# Patient Record
Sex: MALE | Race: BLACK OR AFRICAN AMERICAN | NOT HISPANIC OR LATINO | Employment: UNEMPLOYED | URBAN - METROPOLITAN AREA
[De-identification: names, ages, dates, MRNs, and addresses within clinical notes are randomized per-mention and may not be internally consistent; named-entity substitution may affect disease eponyms.]

---

## 2019-02-06 ENCOUNTER — OFFICE VISIT CONVERTED (OUTPATIENT)
Dept: INTERNAL MEDICINE | Facility: CLINIC | Age: 54
End: 2019-02-06
Attending: NURSE PRACTITIONER

## 2019-02-06 ENCOUNTER — CONVERSION ENCOUNTER (OUTPATIENT)
Dept: INTERNAL MEDICINE | Facility: CLINIC | Age: 54
End: 2019-02-06

## 2019-03-06 ENCOUNTER — OFFICE VISIT CONVERTED (OUTPATIENT)
Dept: INTERNAL MEDICINE | Facility: CLINIC | Age: 54
End: 2019-03-06
Attending: NURSE PRACTITIONER

## 2019-05-02 ENCOUNTER — OFFICE VISIT CONVERTED (OUTPATIENT)
Dept: NEUROLOGY | Facility: CLINIC | Age: 54
End: 2019-05-02
Attending: PSYCHIATRY & NEUROLOGY

## 2019-05-02 ENCOUNTER — CONVERSION ENCOUNTER (OUTPATIENT)
Dept: NEUROLOGY | Facility: CLINIC | Age: 54
End: 2019-05-02

## 2019-05-16 ENCOUNTER — OFFICE VISIT CONVERTED (OUTPATIENT)
Dept: INTERNAL MEDICINE | Facility: CLINIC | Age: 54
End: 2019-05-16
Attending: NURSE PRACTITIONER

## 2019-05-16 ENCOUNTER — HOSPITAL ENCOUNTER (OUTPATIENT)
Dept: OTHER | Facility: HOSPITAL | Age: 54
Discharge: HOME | End: 2019-05-16
Attending: NURSE PRACTITIONER

## 2019-05-16 ENCOUNTER — CONVERSION ENCOUNTER (OUTPATIENT)
Dept: INTERNAL MEDICINE | Facility: CLINIC | Age: 54
End: 2019-05-16

## 2020-10-23 ENCOUNTER — OFFICE VISIT CONVERTED (OUTPATIENT)
Dept: INTERNAL MEDICINE | Facility: CLINIC | Age: 55
End: 2020-10-23
Attending: NURSE PRACTITIONER

## 2020-11-12 ENCOUNTER — OFFICE VISIT CONVERTED (OUTPATIENT)
Dept: ORTHOPEDIC SURGERY | Facility: CLINIC | Age: 55
End: 2020-11-12
Attending: ORTHOPAEDIC SURGERY

## 2020-11-12 ENCOUNTER — CONVERSION ENCOUNTER (OUTPATIENT)
Dept: ORTHOPEDIC SURGERY | Facility: CLINIC | Age: 55
End: 2020-11-12

## 2020-12-09 ENCOUNTER — HOSPITAL ENCOUNTER (OUTPATIENT)
Dept: PREADMISSION TESTING | Facility: HOSPITAL | Age: 55
Discharge: HOME | End: 2020-12-09
Attending: ORTHOPAEDIC SURGERY

## 2020-12-11 LAB — SARS-COV-2 RNA SPEC QL NAA+PROBE: DETECTED

## 2021-01-08 ENCOUNTER — HOSPITAL ENCOUNTER (OUTPATIENT)
Dept: PREADMISSION TESTING | Facility: HOSPITAL | Age: 56
Discharge: HOME | End: 2021-01-08
Attending: ORTHOPAEDIC SURGERY

## 2021-01-11 ENCOUNTER — HOSPITAL ENCOUNTER (OUTPATIENT)
Dept: PERIOP | Facility: HOSPITAL | Age: 56
Setting detail: HOSPITAL OUTPATIENT SURGERY
Discharge: HOME | End: 2021-01-11
Attending: ORTHOPAEDIC SURGERY

## 2021-01-26 ENCOUNTER — OFFICE VISIT CONVERTED (OUTPATIENT)
Dept: ORTHOPEDIC SURGERY | Facility: CLINIC | Age: 56
End: 2021-01-26
Attending: ORTHOPAEDIC SURGERY

## 2021-05-10 NOTE — H&P
History and Physical      Patient Name: Eric Pal   Patient ID: [id number]   Sex: Male   YOB: 1965    Primary Care Provider: Tawanna QUINONES   Referring Provider: Tawanna QUINONES    Visit Date: November 12, 2020    Provider: Wild Henriquez MD   Location: [facility]   Location Address: [address] [place] [address]  [id number]   Location Phone: [phone]          Chief Complaint  · Left hand pain      History Of Present Illness  Eric Pal is a 55 year old /Black male who presents today to [facility].      The patient presents today for evaluation of the left hand nodule. This has been present over the last 2 months. He denies specific injury or trauma to the hand. No other treatments have been tried.          Past Medical History  Hyperlipidemia; Sarcoidosis; Seizures; Shortness of Breath; Stroke         Past Surgical History  I have had no surgeries; Pacemaker.; Sinus Surgery         Medication List  aspirin 81 mg oral tablet,chewable; prednisone 20 mg oral tablet; Symbicort 160-4.5 mcg/actuation inhalation HFA aerosol inhaler         Allergy List  PENICILLINS       Allergies Reconciled  Family Medical History  Diabetes, unspecified type; Diabetes; Family history of Arthritis; Family history of diabetes mellitus         Social History  Alcohol (Current some day); Alcohol Use (Current some day); lives with spouse; ; .; Recreational Drug Use (Never); Retired; Retired.; Tobacco (Never)         Review of Systems  · Constitutional  o Denies  o : fever, chills, weight loss  · Cardiovascular  o Denies  o : chest pain, shortness of breath  · Gastrointestinal  o Denies  o : liver disease, heartburn, nausea, blood in stools  · Genitourinary  o Denies  o : painful urination, blood in urine  · Integument  o Denies  o : rash, itching  · Neurologic  o Denies  o : headache, weakness, loss of  "consciousness  · Musculoskeletal  o Denies  o : painful, swollen joints  · Psychiatric  o Denies  o : drug/alcohol addiction, anxiety, depression      Vitals  Date Time BP Position Site L\R Cuff Size HR RR TEMP (F) WT  HT  BMI kg/m2 BSA m2 O2 Sat FR L/min FiO2 HC       11/12/2020 09:07 AM         245lbs 0oz 6'  2\" 31.46 2.41             Physical Examination  · Constitutional  o Appearance  o : well developed, well-nourished, no obvious deformities present  · Head and Face  o Head  o :   § Inspection  § : normocephalic  o Face  o :   § Inspection  § : no facial lesions  · Eyes  o Conjunctivae  o : conjunctivae normal  o Sclerae  o : sclerae white  · Ears, Nose, Mouth and Throat  o Ears  o :   § External Ears  § : appearance within normal limits  § Hearing  § : intact  o Nose  o :   § External Nose  § : appearance normal  · Neck  o Inspection/Palpation  o : normal appearance  o Range of Motion  o : full range of motion  · Respiratory  o Respiratory Effort  o : breathing unlabored  o Inspection of Chest  o : normal appearance  o Auscultation of Lungs  o : no audible wheezing or rales  · Cardiovascular  o Heart  o : regular rate  · Gastrointestinal  o Abdominal Examination  o : soft and non-tender  · Skin and Subcutaneous Tissue  o General Inspection  o : intact, no rashes  · Psychiatric  o General  o : Alert and oriented x3  o Judgement and Insight  o : judgment and insight intact  o Mood and Affect  o : mood normal, affect appropriate  · Left Hand  o Inspection  o : Tender nodule over volar MCP over 4th finger, intact ROM, no bruising, no triggering or locking, Neurovascularly intact, sensation intact, skin intact   · In Office Procedures  o View  o : AP/LATERAL  o Site  o : Left hand   o Indication  o : Left hand pain   o Study  o : X-rays ordered, taken in the office, and reviewed today.  o Xray  o : Reveals no acute fracture, no subluxation or dislocation   o Comparative Data  o : No comparative data " found              Assessment  · Arthralgia of left hand     719.44/M25.542  · Nodule of left hand     782.2/R22.30      Plan  · Orders  o Hand (Left) [facility] Preferred View (65299-OL) - 719.44/M25.542 - 11/12/2020  · Medications  o Medications have been Reconciled  o Transition of Care or Provider Policy  · Instructions  o Reviewed the patient's Past Medical, Social, and Family history as well as the ROS at today's visit, no changes.  o Call or return if worsening symptoms.  o Discussed surgery.  o Risks/benefits discussed with patient including, but not limited to: infection, bleeding, neurovascular damage, malunion, nonunion, aesthetic deformity, need for further surgery, and death.  o Surgery pamphlet given.  o X-ray ordered, taken and reviewed at this visit.  o The above service was scribed by Bree Rehman on my behalf and I attest to the accuracy of the note. jsb  o Discussed treatment options. Plan for excision of left hand nodule, risks and benefits discussed. He expressed understanding and wished to proceed. Follow-up two weeks postoperative.             Electronically Signed by: Bree Rehman-, Other -Author on November 13, 2020 02:09:47 PM  Electronically Co-signed by: Wild Henriquez MD -Reviewer on November 15, 2020 08:41:34 AM

## 2021-05-13 NOTE — PROGRESS NOTES
Progress Note      Patient Name: Eric Pal   Patient ID: [id number]   Sex: Male   YOB: 1965    Primary Care Provider: Tawanna QUINONES   Referring Provider: Tawanna QUINONES    Visit Date: October 23, 2020    Provider: STACIE Dinero   Location: [facility]   Location Address: [address] [place] [address], [facility]  [place], [place]  [id number]   Location Phone: [phone]          Chief Complaint  · Hand pain      History Of Present Illness  Eric Pal is a 55 year old /Black male who presents for evaluation and treatment of:      Patient reports a bump on the palm of his left hand x 1 month. States it is very tender to touch and with picking things up. Denies direct injury, decreased  strength or weakness. Has not changed in size since he first noticed it.       Past Medical History  Disease Name Date Onset Notes   Hyperlipidemia --  --    Sarcoidosis --  --    Seizures --  --    Stroke 9/18/2017 --          Past Surgical History  Procedure Name Date Notes   Sinus Surgery --  --          Medication List  Name Date Started Instructions   aspirin 81 mg oral tablet,chewable 03/06/2019 chew 1 tablet (81 mg) by oral route once daily   prednisone 20 mg oral tablet 05/16/2019 take 2 tablets (40 mg) by oral route once daily for 5 days   Symbicort 160-4.5 mcg/actuation inhalation HFA aerosol inhaler  inhale 2 puffs by inhalation route 2 times per day in the morning and evening         Allergy List  Allergen Name Date Reaction Notes   PENICILLINS --  --  --        Allergies Reconciled  Family Medical History  Disease Name Relative/Age Notes   Diabetes  --    Family history of Arthritis Mother/   Mother   Family history of diabetes mellitus Father/   Father         Social History  Finding Status Start/Stop Quantity Notes   Alcohol Current some day --/-- --  rarely drinks, less than 1 drink per day, has been drinking for 31 or more years    --  --/--  "--  --    Retired --  --/-- --  --    Tobacco Never --/-- --  never smoker         Review of Systems  · Constitutional  o Denies  o : fever, fatigue, weight loss, weight gain  · Cardiovascular  o Denies  o : lower extremity edema, chest pressure, palpitations  · Respiratory  o Denies  o : shortness of breath, wheezing, cough, dyspnea on exertion  · Gastrointestinal  o Denies  o : nausea, vomiting, diarrhea, constipation, abdominal pain  · Integument  o Denies  o : rash, pigmentation changes, new skin lesions  · Musculoskeletal  o Admits  o : hand pain  o Denies  o : joint pain, limitation of motion      Vitals  Date Time BP Position Site L\R Cuff Size HR RR TEMP (F) WT  HT  BMI kg/m2 BSA m2 O2 Sat FR L/min FiO2 HC       05/16/2019 08:05 /100 Sitting    69 - R 16 96.7 245lbs 0oz 6'  2\" 31.46 2.41 97 %      05/16/2019 09:02 /90 Sitting                 10/23/2020 11:38 /88 Sitting    69 - R  98.9 244lbs 4oz 6'  2\" 31.36 2.41 97 %  21%          Physical Examination  · Constitutional  o Appearance  o : no acute distress, well-nourished  · Head and Face  o Head  o :   § Inspection  § : atraumatic, normocephalic  · Ears, Nose, Mouth and Throat  o Ears  o :   § External Ears  § : normal  o Nose  o :   § Intranasal Exam  § : nares patent  o Oral Cavity  o :   § Oral Mucosa  § : moist mucous membranes  · Respiratory  o Respiratory Effort  o : breathing comfortably, symmetric chest rise  o Auscultation of Lungs  o : clear to asculatation bilaterally, no wheezes, rales, or rhonchii  · Cardiovascular  o Heart  o :   § Auscultation of Heart  § : regular rate and rhythm, no murmurs, rubs, or gallops  o Peripheral Vascular System  o :   § Extremities  § : no edema  · Skin and Subcutaneous Tissue  o General Inspection  o : no lesions present, no areas of discoloration, skin turgor normal  · Neurologic  o Mental Status Examination  o :   § Orientation  § : grossly oriented to person, place and time  o Gait and " Station  o :   § Gait Screening  § : normal gait     small, firm, pea-sized cyst palm of left hand inferior to third digit; tender to touch; patient with full ROM               Assessment  · Ganglion cyst of tendon sheath of left hand     727.42/M67.442  Appears to be a ganglion cyst. Patient prefers local referral, will send to orthopedics for further evaluation. He is aware that referral to a hand specialist may be warranted in the future. He will continue to monitor and call or return to clinic with concerns.     Problems Reconciled  Plan  · Orders  o ACO-39: Current medications updated and reviewed (1159F, ) - - 10/23/2020  o ORTHOPEDIC CONSULTATION (ORTHO) - 727.42/M67.442 - 10/23/2020  · Medications  o Medications have been Reconciled  o Transition of Care or Provider Policy  · Instructions  o Patient was educated/instructed on their diagnosis, treatment and medications prior to discharge from the clinic today.  o Patient instructed to seek medical attention urgently for new or worsening symptoms.  o Call the office with any concerns or questions.  · Disposition  o Call or Return if symptoms worsen or persist.  · Referrals  o Inbound Referral/Transition            Electronically Signed by: STACIE Dinero -Author on October 23, 2020 12:19:27 PM

## 2021-05-14 VITALS
OXYGEN SATURATION: 97 % | WEIGHT: 244.25 LBS | SYSTOLIC BLOOD PRESSURE: 126 MMHG | DIASTOLIC BLOOD PRESSURE: 88 MMHG | BODY MASS INDEX: 31.35 KG/M2 | HEIGHT: 74 IN | TEMPERATURE: 98.9 F | HEART RATE: 69 BPM

## 2021-05-14 VITALS — BODY MASS INDEX: 31.44 KG/M2 | WEIGHT: 245 LBS | HEIGHT: 74 IN

## 2021-05-14 VITALS — BODY MASS INDEX: 30.16 KG/M2 | HEIGHT: 74 IN | WEIGHT: 235 LBS

## 2021-05-14 NOTE — PROGRESS NOTES
Progress Note      Patient Name: Eric Pal   Patient ID: [id number]   Sex: Male   YOB: 1965    Primary Care Provider: Tawanna QUINONES   Referring Provider: Tawanna QUINONES    Visit Date: January 26, 2021    Provider: Wild Henriquez MD   Location: [facility]   Location Address: [address] [place] [address]  [id number]   Location Phone: [phone]          Chief Complaint  · Left hand pain      History Of Present Illness  Eric Pal is a 55 year old /Black male who presents today to [facility].      The patient presents here today for follow up evaluation of his left hand. He is S/P Left hand ganglion cyst excision 1/11/2021. His sutures were removed in clinic today. He has no other complaints today.           Past Medical History  Hyperlipidemia; Sarcoidosis; Seizures; Shortness of Breath; Stroke         Past Surgical History  I have had no surgeries; Pacemaker.; Sinus Surgery         Medication List  aspirin 81 mg oral tablet,chewable; prednisone 20 mg oral tablet; Symbicort 160-4.5 mcg/actuation inhalation HFA aerosol inhaler         Allergy List  PENICILLINS       Allergies Reconciled  Family Medical History  Diabetes, unspecified type; Diabetes; Family history of Arthritis; Family history of diabetes mellitus         Social History  Alcohol (Current some day); Alcohol Use (Current some day); lives with spouse; ; .; Recreational Drug Use (Never); Retired; Retired.; Tobacco (Never)         Review of Systems  · Constitutional  o Denies  o : fever, chills, weight loss  · Cardiovascular  o Denies  o : chest pain, shortness of breath  · Gastrointestinal  o Denies  o : liver disease, heartburn, nausea, blood in stools  · Genitourinary  o Denies  o : painful urination, blood in urine  · Integument  o Denies  o : rash, itching  · Neurologic  o Denies  o : headache, weakness, loss of  "consciousness  · Musculoskeletal  o Denies  o : painful, swollen joints  · Psychiatric  o Denies  o : drug/alcohol addiction, anxiety, depression      Vitals  Date Time BP Position Site L\R Cuff Size HR RR TEMP (F) WT  HT  BMI kg/m2 BSA m2 O2 Sat FR L/min FiO2 HC       01/26/2021 08:33 AM         235lbs 0oz 6'  2\" 30.17 2.36             Physical Examination  · Constitutional  o Appearance  o : well developed, well-nourished, no obvious deformities present  · Head and Face  o Head  o :   § Inspection  § : normocephalic  o Face  o :   § Inspection  § : no facial lesions  · Eyes  o Conjunctivae  o : conjunctivae normal  o Sclerae  o : sclerae white  · Ears, Nose, Mouth and Throat  o Ears  o :   § External Ears  § : appearance within normal limits  § Hearing  § : intact  o Nose  o :   § External Nose  § : appearance normal  · Neck  o Inspection/Palpation  o : normal appearance  o Range of Motion  o : full range of motion  · Respiratory  o Respiratory Effort  o : breathing unlabored  o Inspection of Chest  o : normal appearance  o Auscultation of Lungs  o : no audible wheezing or rales  · Cardiovascular  o Heart  o : regular rate  · Gastrointestinal  o Abdominal Examination  o : soft and non-tender  · Skin and Subcutaneous Tissue  o General Inspection  o : intact, no rashes  · Psychiatric  o General  o : Alert and oriented x3  o Judgement and Insight  o : judgment and insight intact  o Mood and Affect  o : mood normal, affect appropriate  · Left Hand  o Inspection  o : Incision well healing. No signs of infection. Neurovascularly intact. ROM intact.           Assessment  · Aftercare following Left hand ganglion cyst excision     V54.81  · Arthralgia of left hand     719.44/M25.542      Plan  · Medications  o Medications have been Reconciled  o Transition of Care or Provider Policy  · Instructions  o Reviewed the patient's Past Medical, Social, and Family history as well as the ROS at today's visit, no changes.  o Call or " return if worsening symptoms.  o Follow Up PRN.  o The above service was scribed by Marilyn Dejesus on my behalf and I attest to the accuracy of the note. jsb  o Discussed the treatment plan with the patient. Activity as tolerated once the incision heals up. Follow up PRN  o Electronically Identified Patient Education Materials Provided Electronically  · Referrals  o ID: [id number] Date: 11/06/2020 Type: Inbound  Specialty: Orthopedic Surgery            Electronically Signed by: Marilyn Dejesus MA -Author on January 26, 2021 09:52:51 AM  Electronically Co-signed by: Wild Henriquez MD -Reviewer on January 27, 2021 07:19:22 AM

## 2021-05-15 VITALS
OXYGEN SATURATION: 97 % | RESPIRATION RATE: 16 BRPM | HEIGHT: 74 IN | TEMPERATURE: 96.7 F | BODY MASS INDEX: 31.44 KG/M2 | WEIGHT: 245 LBS | HEART RATE: 69 BPM | SYSTOLIC BLOOD PRESSURE: 140 MMHG | DIASTOLIC BLOOD PRESSURE: 100 MMHG

## 2021-05-15 VITALS
OXYGEN SATURATION: 96 % | HEIGHT: 74 IN | TEMPERATURE: 97.5 F | RESPIRATION RATE: 16 BRPM | WEIGHT: 243.5 LBS | DIASTOLIC BLOOD PRESSURE: 82 MMHG | BODY MASS INDEX: 31.25 KG/M2 | SYSTOLIC BLOOD PRESSURE: 118 MMHG | HEART RATE: 62 BPM

## 2021-05-15 VITALS
WEIGHT: 241.37 LBS | HEART RATE: 73 BPM | DIASTOLIC BLOOD PRESSURE: 80 MMHG | SYSTOLIC BLOOD PRESSURE: 112 MMHG | RESPIRATION RATE: 16 BRPM | TEMPERATURE: 97.6 F | HEIGHT: 74 IN | BODY MASS INDEX: 30.98 KG/M2 | OXYGEN SATURATION: 97 %

## 2021-05-15 VITALS — SYSTOLIC BLOOD PRESSURE: 140 MMHG | DIASTOLIC BLOOD PRESSURE: 90 MMHG

## 2021-05-15 VITALS
HEIGHT: 74 IN | BODY MASS INDEX: 31.38 KG/M2 | WEIGHT: 244.5 LBS | HEART RATE: 68 BPM | SYSTOLIC BLOOD PRESSURE: 126 MMHG | DIASTOLIC BLOOD PRESSURE: 78 MMHG

## 2022-01-19 ENCOUNTER — OFFICE VISIT (OUTPATIENT)
Dept: INTERNAL MEDICINE | Facility: CLINIC | Age: 57
End: 2022-01-19

## 2022-01-19 VITALS
DIASTOLIC BLOOD PRESSURE: 64 MMHG | BODY MASS INDEX: 31.06 KG/M2 | OXYGEN SATURATION: 97 % | SYSTOLIC BLOOD PRESSURE: 120 MMHG | RESPIRATION RATE: 12 BRPM | TEMPERATURE: 97.7 F | HEIGHT: 74 IN | HEART RATE: 72 BPM | WEIGHT: 242 LBS

## 2022-01-19 DIAGNOSIS — Z98.890 HISTORY OF SINUS SURGERY: Primary | ICD-10-CM

## 2022-01-19 DIAGNOSIS — D86.9 SARCOIDOSIS: ICD-10-CM

## 2022-01-19 DIAGNOSIS — J34.89 NASAL DISCHARGE: ICD-10-CM

## 2022-01-19 PROCEDURE — 99213 OFFICE O/P EST LOW 20 MIN: CPT | Performed by: NURSE PRACTITIONER

## 2022-01-19 RX ORDER — BUDESONIDE AND FORMOTEROL FUMARATE DIHYDRATE 160; 4.5 UG/1; UG/1
2 AEROSOL RESPIRATORY (INHALATION) 2 TIMES DAILY
Qty: 10.2 G | Refills: 1 | Status: SHIPPED | OUTPATIENT
Start: 2022-01-19 | End: 2022-01-24 | Stop reason: SDUPTHER

## 2022-01-19 NOTE — ASSESSMENT & PLAN NOTE
Referral to ENT for further evaluation. Discussed with patient that restarting Singulair and continuing Flonase may help to improve symptoms, he will discuss further with specialist. Will call or return to clinic with concerns.

## 2022-01-19 NOTE — PATIENT INSTRUCTIONS
Preventing High Cholesterol  Cholesterol is a white, waxy substance similar to fat that the human body needs to help build cells. The liver makes all the cholesterol that a person's body needs. Having high cholesterol (hypercholesterolemia) increases your risk for heart disease and stroke. Extra or excess cholesterol comes from the food that you eat.  High cholesterol can often be prevented with diet and lifestyle changes. If you already have high cholesterol, you can control it with diet, lifestyle changes, and medicines.  How can high cholesterol affect me?  If you have high cholesterol, fatty deposits (plaques) may build up on the walls of your blood vessels. The blood vessels that carry blood away from your heart are called arteries. Plaques make the arteries narrower and stiffer. This in turn can:  · Restrict or block blood flow and cause blood clots to form.  · Increase your risk for heart attack and stroke.  What can increase my risk for high cholesterol?  This condition is more likely to develop in people who:  · Eat foods that are high in saturated fat or cholesterol. Saturated fat is mostly found in foods that come from animal sources.  · Are overweight.  · Are not getting enough exercise.  · Have a family history of high cholesterol (familial hypercholesterolemia).  What actions can I take to prevent this?  Nutrition    · Eat less saturated fat.  · Avoid trans fats (partially hydrogenated oils). These are often found in margarine and in some baked goods, fried foods, and snacks bought in packages.  · Avoid precooked or cured meat, such as yee, sausages, or meat loaves.  · Avoid foods and drinks that have added sugars.  · Eat more fruits, vegetables, and whole grains.  · Choose healthy sources of protein, such as fish, poultry, lean cuts of red meat, beans, peas, lentils, and nuts.  · Choose healthy sources of fat, such as:  ? Nuts.  ? Vegetable oils, especially olive oil.  ? Fish that have healthy fats,  such as omega-3 fatty acids. These fish include mackerel or salmon.    Lifestyle  · Lose weight if you are overweight. Maintaining a healthy body mass index (BMI) can help prevent or control high cholesterol. It can also lower your risk for diabetes and high blood pressure. Ask your health care provider to help you with a diet and exercise plan to lose weight safely.  · Do not use any products that contain nicotine or tobacco, such as cigarettes, e-cigarettes, and chewing tobacco. If you need help quitting, ask your health care provider.  Alcohol use  · Do not drink alcohol if:  ? Your health care provider tells you not to drink.  ? You are pregnant, may be pregnant, or are planning to become pregnant.  · If you drink alcohol:  ? Limit how much you use to:  § 0-1 drink a day for women.  § 0-2 drinks a day for men.  ? Be aware of how much alcohol is in your drink. In the [place] one drink equals one 12 oz bottle of beer (355 mL), one 5 oz glass of wine (148 mL), or one 1½ oz glass of hard liquor (44 mL).  Activity    · Get enough exercise. Do exercises as told by your health care provider.  · Each week, do at least 150 minutes of exercise that takes a medium level of effort (moderate-intensity exercise). This kind of exercise:  ? Makes your heart beat faster while allowing you to still be able to talk.  ? Can be done in short sessions several times a day or longer sessions a few times a week. For example, on 5 days each week, you could walk fast or ride your bike 3 times a day for 10 minutes each time.    Medicines  · Your health care provider may recommend medicines to help lower cholesterol. This may be a medicine to lower the amount of cholesterol that your liver makes. You may need medicine if:  ? Diet and lifestyle changes have not lowered your cholesterol enough.  ? You have high cholesterol and other risk factors for heart disease or stroke.  · Take over-the-counter and prescription medicines only as told by  your health care provider.  General information  · Manage your risk factors for high cholesterol. Talk with your health care provider about all your risk factors and how to lower your risk.  · Manage other conditions that you have, such as diabetes or high blood pressure (hypertension).  · Have blood tests to check your cholesterol levels at regular points in time as told by your health care provider.  · Keep all follow-up visits as told by your health care provider. This is important.  Where to find more information  · American Heart Association: www.heart.org  · National Heart, Lung, and Blood [place]: www.nhlbi.nih.gov  Summary  · High cholesterol increases your risk for heart disease and stroke. By keeping your cholesterol level low, you can reduce your risk for these conditions.  · High cholesterol can often be prevented with diet and lifestyle changes.  · Work with your health care provider to manage your risk factors, and have your blood tested regularly.  This information is not intended to replace advice given to you by your health care provider. Make sure you discuss any questions you have with your health care provider.  Document Revised: 09/29/2020 Document Reviewed: 09/29/2020  Elsevier Patient Education © 2021 [employer]

## 2022-01-19 NOTE — ASSESSMENT & PLAN NOTE
Refilled Symbicort per patient request. [facility] will [facility], will continue to monitor.

## 2022-01-21 ENCOUNTER — TELEPHONE (OUTPATIENT)
Dept: INTERNAL MEDICINE | Facility: CLINIC | Age: 57
End: 2022-01-21

## 2022-01-24 RX ORDER — BUDESONIDE AND FORMOTEROL FUMARATE DIHYDRATE 160; 4.5 UG/1; UG/1
2 AEROSOL RESPIRATORY (INHALATION) 2 TIMES DAILY
Qty: 10.2 G | Refills: 1 | Status: SHIPPED | OUTPATIENT
Start: 2022-01-24 | End: 2022-05-02

## 2022-02-14 ENCOUNTER — OFFICE VISIT (OUTPATIENT)
Dept: OTOLARYNGOLOGY | Facility: CLINIC | Age: 57
End: 2022-02-14

## 2022-02-14 VITALS — HEIGHT: 74 IN | BODY MASS INDEX: 31.03 KG/M2 | TEMPERATURE: 97.3 F | WEIGHT: 241.8 LBS

## 2022-02-14 DIAGNOSIS — J31.0 CHRONIC RHINOSINUSITIS: Primary | ICD-10-CM

## 2022-02-14 DIAGNOSIS — J32.9 CHRONIC RHINOSINUSITIS: Primary | ICD-10-CM

## 2022-02-14 PROCEDURE — 87147 CULTURE TYPE IMMUNOLOGIC: CPT | Performed by: NURSE PRACTITIONER

## 2022-02-14 PROCEDURE — 87186 SC STD MICRODIL/AGAR DIL: CPT | Performed by: NURSE PRACTITIONER

## 2022-02-14 PROCEDURE — 99203 OFFICE O/P NEW LOW 30 MIN: CPT | Performed by: NURSE PRACTITIONER

## 2022-02-14 PROCEDURE — 87070 CULTURE OTHR SPECIMN AEROBIC: CPT | Performed by: NURSE PRACTITIONER

## 2022-02-14 PROCEDURE — 87205 SMEAR GRAM STAIN: CPT | Performed by: NURSE PRACTITIONER

## 2022-02-14 RX ORDER — MONTELUKAST SODIUM 10 MG/1
10 TABLET ORAL AS NEEDED
COMMUNITY

## 2022-02-14 RX ORDER — ASPIRIN 81 MG/1
81 TABLET ORAL DAILY
COMMUNITY

## 2022-02-14 RX ORDER — CEPHALEXIN 500 MG/1
500 CAPSULE ORAL 3 TIMES DAILY
Qty: 30 CAPSULE | Refills: 0 | Status: SHIPPED | OUTPATIENT
Start: 2022-02-14 | End: 2022-02-24

## 2022-02-14 RX ORDER — ROSUVASTATIN CALCIUM 20 MG/1
20 TABLET, COATED ORAL DAILY
COMMUNITY

## 2022-02-14 NOTE — PROGRESS NOTES
"Patient Name: Eric Pal   Visit Date: 02/14/2022   Patient ID: [id number]  Provider: STCAIE Berry    Sex: male  Location: [facility]   YOB: 1965  Location Address: [address] [place] [address], [address] [place] [address],?[place]?[id number]    Primary Care Provider Tawanna Real APRN  Location Phone: [phone]    Referring Provider: STACIE Dinero        Chief Complaint  Nasal Drainage    Subjective   Eric Pal is a 56 y.o. male who presents to [facility] today as a consult from STACIE Dinero for evaluation of chronic nasal drainage.  Patient states that in 2017 he had sinus surgery and septoplasty at [facility] in [place].  He states since that time he has had nasal drainage anytime he bends over or turns his head a certain way.  He states that it is thick, green to yellow.  He does rinses of his nose twice daily.  He states that since his sinus surgery he has loss of sense of smell.  He reports that his wife tells him that his rhinitis has a very foul odor.  He breathes well through his nose.  He has not been treated with any course of antibiotic.      Current Outpatient Medications on File Prior to Visit   Medication Sig   • aspirin 81 MG EC tablet Take 81 mg by mouth Daily.   • budesonide-formoterol (Symbicort) 160-4.5 MCG/ACT inhaler Inhale 2 puffs 2 (Two) Times a Day.   • montelukast (SINGULAIR) 10 MG tablet Take 10 mg by mouth Every Night.   • rosuvastatin (CRESTOR) 20 MG tablet Take 40 mg by mouth Daily.     No current facility-administered medications on file prior to visit.        Social History     Tobacco Use   • Smoking status: Never Smoker   • Smokeless tobacco: Never Used   Vaping Use   • Vaping Use: Never used   Substance Use Topics   • Alcohol use: Not on file   • Drug use: Not on file       Objective     Vital Signs:   Temp 97.3 °F (36.3 °C) (Temporal)   Ht 188 cm (74\")   Wt 110 kg (241 lb 12.8 oz)  "  BMI 31.05 kg/m²       Physical Exam  Constitutional:       General: He is not in acute distress.     Appearance: Normal appearance. He is not ill-appearing.   HENT:      Head: Normocephalic and atraumatic.      Jaw: There is normal jaw occlusion. No tenderness or pain on movement.      Salivary Glands: Right salivary gland is not diffusely enlarged or tender. Left salivary gland is not diffusely enlarged or tender.      Right Ear: Tympanic membrane, ear canal and external ear normal.      Left Ear: Tympanic membrane, ear canal and external ear normal.      Nose: Rhinorrhea present. No septal deviation. Rhinorrhea is purulent.      Right Sinus: No maxillary sinus tenderness or frontal sinus tenderness.      Left Sinus: No maxillary sinus tenderness or frontal sinus tenderness.      Mouth/Throat:      Lips: Pink. No lesions.      Mouth: Mucous membranes are moist. No oral lesions.      Dentition: Normal dentition.      Tongue: No lesions.      Palate: No mass and lesions.      Pharynx: Oropharynx is clear. Uvula midline.      Tonsils: No tonsillar exudate.   Eyes:      Extraocular Movements: Extraocular movements intact.      Conjunctiva/sclera: Conjunctivae normal.      Pupils: Pupils are equal, round, and reactive to light.   Neck:      Thyroid: No thyroid mass, thyromegaly or thyroid tenderness.      Trachea: Trachea normal.   Pulmonary:      Effort: Pulmonary effort is normal. No respiratory distress.   Musculoskeletal:         General: Normal range of motion.      Cervical back: Normal range of motion and neck supple. No tenderness.   Lymphadenopathy:      Cervical: No cervical adenopathy.   Skin:     General: Skin is warm and dry.   Neurological:      General: No focal deficit present.      Mental Status: He is alert and oriented to person, place, and time.      Cranial Nerves: No cranial nerve deficit.      Motor: No weakness.      Gait: Gait normal.   Psychiatric:         Mood and Affect: Mood normal.          Behavior: Behavior normal.         Thought Content: Thought content normal.         Judgment: Judgment normal.               Result Review :              Assessment and Plan    Diagnoses and all orders for this visit:    1. Chronic rhinosinusitis (Primary)  -     Wound Culture - Wound, Naris, Right; Future    Other orders  -     cephalexin (KEFLEX) 500 MG capsule; Take 1 capsule by mouth 3 (Three) Times a Day for 10 days.  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  I consulted with Dr. Rutledge.  I will start him on a course of an antibiotic and have him continue to do twice daily rinses.  I am also going to take a culture of his nose.  I will plan to have him return in a month to see Dr. Rutledge to discuss further possible surgical intervention.    I have discussed my findings with Dr. Rutledge who agrees with my assessment and plan.   Follow Up   Return in about 4 weeks (around 3/14/2022), or With Dr. Rutledge.  Patient was given instructions and counseling regarding his condition or for health maintenance advice. Please see specific information pulled into the AVS if appropriate.      STACIE Berry

## 2022-02-17 LAB
BACTERIA SPEC AEROBE CULT: ABNORMAL
GRAM STN SPEC: ABNORMAL

## 2022-03-14 ENCOUNTER — PREP FOR SURGERY (OUTPATIENT)
Dept: OTHER | Facility: HOSPITAL | Age: 57
End: 2022-03-14

## 2022-03-14 ENCOUNTER — OFFICE VISIT (OUTPATIENT)
Dept: OTOLARYNGOLOGY | Facility: CLINIC | Age: 57
End: 2022-03-14

## 2022-03-14 VITALS — HEIGHT: 74 IN | WEIGHT: 239.6 LBS | BODY MASS INDEX: 30.75 KG/M2 | TEMPERATURE: 97.7 F

## 2022-03-14 DIAGNOSIS — J31.0 CHRONIC RHINOSINUSITIS: Primary | ICD-10-CM

## 2022-03-14 DIAGNOSIS — J34.2 NASAL SEPTAL DEVIATION: ICD-10-CM

## 2022-03-14 DIAGNOSIS — J32.9 CHRONIC RHINOSINUSITIS: Primary | ICD-10-CM

## 2022-03-14 PROCEDURE — 99214 OFFICE O/P EST MOD 30 MIN: CPT | Performed by: OTOLARYNGOLOGY

## 2022-03-14 RX ORDER — POLYVINYL ALCOHOL, POVIDONE 14; 6 MG/ML; MG/ML
1 SOLUTION/ DROPS OPHTHALMIC 2 TIMES DAILY
COMMUNITY
Start: 2022-03-02

## 2022-03-14 RX ORDER — CLINDAMYCIN PHOSPHATE 900 MG/50ML
900 INJECTION INTRAVENOUS ONCE
Status: CANCELLED | OUTPATIENT
Start: 2022-03-14 | End: 2022-03-14

## 2022-03-14 NOTE — PROGRESS NOTES
Patient Name: Eric Pal   Visit Date: 03/14/2022   Patient ID: [id number]  Provider: Jay Rutledge MD    Sex: male  [place] [facility]   YOB: 1965  Location Address: [address] [place] [address], [address],?[place]?[address]    Primary Care Provider Tawanna Real APRN  Location Phone: [phone]    Referring Provider: No ref. provider found        Chief Complaint  Chronic rhinosinusitis    History of Present Illness  Eric Pal is a 57 y.o. male who presents to [facility] today as a consult from No ref. provider found for evaluation of his nose.  He was originally seen by STACIE Berry on 2/14/2022 at which time he reported that green to yellow rhinorrhea which was positional since [facility].  He also reported anosmia since surgery but was not have any difficulty with congestion.  He was placed on a 10-day course of Keflex.    He tells me that he continues to experience intermittent foul-smelling thick, cream-colored drainage bilaterally even after the Keflex.  This only occurs when he tilts his head inferiorly.  He has not had any issues with nasal congestion or epistaxis.  He does report anosmia which has been present since surgery.  He has tried saline irrigations and steroid sprays in the past without improvement.      Past Medical History:   Diagnosis Date   • Sinusitis        Past Surgical History:   Procedure Laterality Date   • SINUS SURGERY           Current Outpatient Medications:   •  aspirin 81 MG EC tablet, Take 81 mg by mouth Daily., Disp: , Rfl:   •  budesonide-formoterol (Symbicort) 160-4.5 MCG/ACT inhaler, Inhale 2 puffs 2 (Two) Times a Day., Disp: 10.2 g, Rfl: 1  •  montelukast (SINGULAIR) 10 MG tablet, Take 10 mg by mouth Every Night., Disp: , Rfl:   •  Refresh 1.4-0.6 % ophthalmic solution, , Disp: , Rfl:   •  rosuvastatin (CRESTOR) 20 MG  "tablet, Take 40 mg by mouth Daily., Disp: , Rfl:   •  Wixela Inhub 500-50 MCG/DOSE DISKUS, , Disp: , Rfl:      Allergies   Allergen Reactions   • Penicillins Rash       Social History     Tobacco Use   • Smoking status: Never Smoker   • Smokeless tobacco: Never Used   Vaping Use   • Vaping Use: Never used        Objective     Vital Signs:   Temp 97.7 °F (36.5 °C) (Temporal)   Ht 188 cm (74\")   Wt 109 kg (239 lb 9.6 oz)   BMI 30.76 kg/m²       Physical Exam    General: Well developed, well nourished patient of stated age in no acute distress. Voice is strong and clear.   Head: Normocephalic and atraumatic.  Face: No lesions.  Bilateral parotid and submandibular glands are unremarkable.  Stensen's and Warthin's ducts are productive of clear saliva bilaterally.  House-Brackmann I/VI     bilaterally.   muscles and temporomandibular joint nontender to palpation.  No TMJ crepitus.  Eyes: PERRLA, sclerae anicteric, no conjunctival injection. Extra ocular movements are intact and full. No nystagmus.   Ears: Auricles are normal in appearance. Bilateral external auditory canals are unremarkable. Bilateral tympanic membranes are clear and without effusion. Hearing normal to conversational voice.   Nose: External nose is normal in appearance. Bilateral nares are patent with normal appearing mucosa. Septum deviated to the left.  Turbinates are unremarkable. No lesions.   Oral Cavity: Lips are normal in appearance. Oral mucosa is unremarkable. Gingiva is unremarkable. Normal dentition for age. Tongue is unremarkable with good movement. Hard palate is unremarkable.   Oropharynx: Soft palate is unremarkable with full movement. Uvula is unremarkable. Bilateral tonsils are unremarkable. Posterior oropharynx is unremarkable.    Larynx and hypopharynx: Deferred secondary to gag reflex.  Neck: Supple.  No mass.  Nontender to palpation.  Trachea midline. Thyroid normal size and without nodules to palpation.   Lymphatic: No " lymphadenopathy upon palpation.  Respiratory: Clear to auscultation bilaterally, nonlabored respirations    Cardiovascular: RRR, no murmurs, rubs, or gallops,   Psychiatric: Appropriate affect, cooperative   Neurologic: Oriented x 3, strength symmetric in all extremities, Cranial Nerves II-XII are grossly intact to confrontation   Skin: Warm and dry. No rashes.    Procedures     Diagnostic nasal endoscopy:    Indications: Bilateral rhinorrhea in a patient with previous functional endoscopic sinus surgery.    Summary: Patient's bilateral nares were decongested and anesthetized with Afrin and lidocaine sprays respectively. After giving the medications ample time to take effect a 0° rigid endoscope was inserted in the bilateral nares revealing a left deviated nasal septum. The bilateral inferior and middle turbinates were normal in appearance. T bilateral maxillary antrostomies and total ethmoidectomies appear widely patent.  There is no obvious infection on examination today but mucus pooling in both maxillary sinuses.  The nasopharynx and bilateral eustachian tube orifices were without mass or lesion. The nasal mucosa was normal in appearance. The patient tolerated the procedure well.      Result Review :               Assessment and Plan    Diagnoses and all orders for this visit:    1. Chronic rhinosinusitis (Primary)    2. Nasal septal deviation    Impressions and findings were discussed with Mr. Pal at great length.  Currently, we discussed my concern that he is experiencing ciliary dysfunction causing mucus to pool within his maxillary sinuses.  Options for further evaluation and management were discussed including continued observation with saline irrigations versus bilateral megaantrostomies with possible septoplasty given how narrow his left nasal cavity is and that this may limit endoscopic surgery.  The risks for sinus surgery include bleeding, infection, scarring, diminished sense of smell, double  vision, vision loss, numbness of the face, numbness of the teeth, CSF leak, and need for further surgeries.  After thorough discussion he would like to proceed with endoscopic bilateral megaantrostomies with possible septoplasty.    Follow Up   Return if symptoms worsen or fail to improve.  Patient was given instructions and counseling regarding his condition or for health maintenance advice. Please see specific information pulled into the AVS if appropriate.

## 2022-03-28 ENCOUNTER — PRE-ADMISSION TESTING (OUTPATIENT)
Dept: PREADMISSION TESTING | Facility: HOSPITAL | Age: 57
End: 2022-03-28

## 2022-03-28 VITALS
TEMPERATURE: 97.5 F | SYSTOLIC BLOOD PRESSURE: 138 MMHG | RESPIRATION RATE: 16 BRPM | HEIGHT: 74 IN | OXYGEN SATURATION: 97 % | HEART RATE: 63 BPM | BODY MASS INDEX: 30.78 KG/M2 | DIASTOLIC BLOOD PRESSURE: 76 MMHG | WEIGHT: 239.86 LBS

## 2022-03-28 DIAGNOSIS — Z01.818 PREOP TESTING: Primary | ICD-10-CM

## 2022-03-28 PROCEDURE — 93005 ELECTROCARDIOGRAM TRACING: CPT

## 2022-03-28 PROCEDURE — 93010 ELECTROCARDIOGRAM REPORT: CPT | Performed by: INTERNAL MEDICINE

## 2022-03-28 RX ORDER — FLUTICASONE PROPIONATE 50 MCG
2 SPRAY, SUSPENSION (ML) NASAL DAILY
COMMUNITY

## 2022-03-28 RX ORDER — ALBUTEROL SULFATE 2.5 MG/3ML
2.5 SOLUTION RESPIRATORY (INHALATION) EVERY 4 HOURS PRN
COMMUNITY

## 2022-03-28 RX ORDER — IBUPROFEN 800 MG/1
800 TABLET ORAL 3 TIMES DAILY PRN
COMMUNITY

## 2022-03-28 RX ORDER — ALBUTEROL SULFATE 90 UG/1
2 AEROSOL, METERED RESPIRATORY (INHALATION) EVERY 4 HOURS PRN
COMMUNITY

## 2022-04-04 ENCOUNTER — ANESTHESIA EVENT (OUTPATIENT)
Dept: PERIOP | Facility: HOSPITAL | Age: 57
End: 2022-04-04

## 2022-04-05 ENCOUNTER — ANESTHESIA (OUTPATIENT)
Dept: PERIOP | Facility: HOSPITAL | Age: 57
End: 2022-04-05

## 2022-04-05 ENCOUNTER — HOSPITAL ENCOUNTER (OUTPATIENT)
Facility: HOSPITAL | Age: 57
Setting detail: HOSPITAL OUTPATIENT SURGERY
Discharge: HOME | End: 2022-04-05
Attending: OTOLARYNGOLOGY | Admitting: OTOLARYNGOLOGY

## 2022-04-05 VITALS
TEMPERATURE: 97 F | WEIGHT: 235.89 LBS | RESPIRATION RATE: 18 BRPM | HEIGHT: 74 IN | BODY MASS INDEX: 30.27 KG/M2 | HEART RATE: 64 BPM | SYSTOLIC BLOOD PRESSURE: 143 MMHG | OXYGEN SATURATION: 96 % | DIASTOLIC BLOOD PRESSURE: 95 MMHG

## 2022-04-05 DIAGNOSIS — J32.9 CHRONIC RHINOSINUSITIS: ICD-10-CM

## 2022-04-05 DIAGNOSIS — J31.0 CHRONIC RHINOSINUSITIS: ICD-10-CM

## 2022-04-05 DIAGNOSIS — J34.2 NASAL SEPTAL DEVIATION: ICD-10-CM

## 2022-04-05 PROCEDURE — 25010000002 MIDAZOLAM PER 1 MG: Performed by: ANESTHESIOLOGY

## 2022-04-05 PROCEDURE — 31256 EXPLORATION MAXILLARY SINUS: CPT | Performed by: OTOLARYNGOLOGY

## 2022-04-05 PROCEDURE — 25010000002 ONDANSETRON PER 1 MG: Performed by: NURSE ANESTHETIST, CERTIFIED REGISTERED

## 2022-04-05 PROCEDURE — 25010000002 DEXAMETHASONE PER 1 MG: Performed by: NURSE ANESTHETIST, CERTIFIED REGISTERED

## 2022-04-05 PROCEDURE — 25010000002 PROPOFOL 10 MG/ML EMULSION: Performed by: NURSE ANESTHETIST, CERTIFIED REGISTERED

## 2022-04-05 PROCEDURE — 25010000002 FENTANYL CITRATE (PF) 50 MCG/ML SOLUTION: Performed by: NURSE ANESTHETIST, CERTIFIED REGISTERED

## 2022-04-05 RX ORDER — ACETAMINOPHEN 500 MG
1000 TABLET ORAL ONCE
Status: COMPLETED | OUTPATIENT
Start: 2022-04-05 | End: 2022-04-05

## 2022-04-05 RX ORDER — MIDAZOLAM HYDROCHLORIDE 1 MG/ML
2 INJECTION INTRAMUSCULAR; INTRAVENOUS ONCE
Status: COMPLETED | OUTPATIENT
Start: 2022-04-05 | End: 2022-04-05

## 2022-04-05 RX ORDER — CLINDAMYCIN PHOSPHATE 900 MG/50ML
900 INJECTION INTRAVENOUS ONCE
Status: COMPLETED | OUTPATIENT
Start: 2022-04-05 | End: 2022-04-05

## 2022-04-05 RX ORDER — PREDNISONE 20 MG/1
40 TABLET ORAL DAILY
Qty: 10 TABLET | Refills: 0 | Status: SHIPPED | OUTPATIENT
Start: 2022-04-05 | End: 2022-04-10

## 2022-04-05 RX ORDER — PROMETHAZINE HYDROCHLORIDE 12.5 MG/1
25 TABLET ORAL ONCE AS NEEDED
Status: DISCONTINUED | OUTPATIENT
Start: 2022-04-05 | End: 2022-04-05 | Stop reason: HOSPADM

## 2022-04-05 RX ORDER — PROPOFOL 10 MG/ML
VIAL (ML) INTRAVENOUS AS NEEDED
Status: DISCONTINUED | OUTPATIENT
Start: 2022-04-05 | End: 2022-04-05 | Stop reason: SURG

## 2022-04-05 RX ORDER — FENTANYL CITRATE 50 UG/ML
INJECTION, SOLUTION INTRAMUSCULAR; INTRAVENOUS AS NEEDED
Status: DISCONTINUED | OUTPATIENT
Start: 2022-04-05 | End: 2022-04-05 | Stop reason: SURG

## 2022-04-05 RX ORDER — MEPERIDINE HYDROCHLORIDE 25 MG/ML
12.5 INJECTION INTRAMUSCULAR; INTRAVENOUS; SUBCUTANEOUS
Status: DISCONTINUED | OUTPATIENT
Start: 2022-04-05 | End: 2022-04-05 | Stop reason: HOSPADM

## 2022-04-05 RX ORDER — GINSENG 100 MG
CAPSULE ORAL AS NEEDED
Status: DISCONTINUED | OUTPATIENT
Start: 2022-04-05 | End: 2022-04-05 | Stop reason: HOSPADM

## 2022-04-05 RX ORDER — LIDOCAINE HYDROCHLORIDE 20 MG/ML
INJECTION, SOLUTION INFILTRATION; PERINEURAL AS NEEDED
Status: DISCONTINUED | OUTPATIENT
Start: 2022-04-05 | End: 2022-04-05 | Stop reason: SURG

## 2022-04-05 RX ORDER — CIPROFLOXACIN 500 MG/1
500 TABLET, FILM COATED ORAL EVERY 12 HOURS SCHEDULED
Qty: 20 TABLET | Refills: 0 | Status: SHIPPED | OUTPATIENT
Start: 2022-04-05 | End: 2022-04-15

## 2022-04-05 RX ORDER — OXYMETAZOLINE HYDROCHLORIDE 0.05 G/100ML
SPRAY NASAL AS NEEDED
Status: DISCONTINUED | OUTPATIENT
Start: 2022-04-05 | End: 2022-04-05 | Stop reason: HOSPADM

## 2022-04-05 RX ORDER — PROMETHAZINE HYDROCHLORIDE 25 MG/1
25 SUPPOSITORY RECTAL ONCE AS NEEDED
Status: DISCONTINUED | OUTPATIENT
Start: 2022-04-05 | End: 2022-04-05 | Stop reason: HOSPADM

## 2022-04-05 RX ORDER — SODIUM CHLORIDE, SODIUM LACTATE, POTASSIUM CHLORIDE, CALCIUM CHLORIDE 600; 310; 30; 20 MG/100ML; MG/100ML; MG/100ML; MG/100ML
9 INJECTION, SOLUTION INTRAVENOUS CONTINUOUS PRN
Status: DISCONTINUED | OUTPATIENT
Start: 2022-04-05 | End: 2022-04-05 | Stop reason: HOSPADM

## 2022-04-05 RX ORDER — MAGNESIUM HYDROXIDE 1200 MG/15ML
LIQUID ORAL AS NEEDED
Status: DISCONTINUED | OUTPATIENT
Start: 2022-04-05 | End: 2022-04-05 | Stop reason: HOSPADM

## 2022-04-05 RX ORDER — HYDROCODONE BITARTRATE AND ACETAMINOPHEN 10; 325 MG/1; MG/1
1 TABLET ORAL EVERY 4 HOURS PRN
Qty: 18 TABLET | Refills: 0 | Status: SHIPPED | OUTPATIENT
Start: 2022-04-05 | End: 2022-04-08

## 2022-04-05 RX ORDER — DEXAMETHASONE SODIUM PHOSPHATE 4 MG/ML
INJECTION, SOLUTION INTRA-ARTICULAR; INTRALESIONAL; INTRAMUSCULAR; INTRAVENOUS; SOFT TISSUE AS NEEDED
Status: DISCONTINUED | OUTPATIENT
Start: 2022-04-05 | End: 2022-04-05 | Stop reason: SURG

## 2022-04-05 RX ORDER — OXYCODONE HYDROCHLORIDE 5 MG/1
5 TABLET ORAL
Status: DISCONTINUED | OUTPATIENT
Start: 2022-04-05 | End: 2022-04-05 | Stop reason: HOSPADM

## 2022-04-05 RX ORDER — ONDANSETRON 2 MG/ML
INJECTION INTRAMUSCULAR; INTRAVENOUS AS NEEDED
Status: DISCONTINUED | OUTPATIENT
Start: 2022-04-05 | End: 2022-04-05 | Stop reason: SURG

## 2022-04-05 RX ORDER — ONDANSETRON 2 MG/ML
4 INJECTION INTRAMUSCULAR; INTRAVENOUS ONCE AS NEEDED
Status: DISCONTINUED | OUTPATIENT
Start: 2022-04-05 | End: 2022-04-05 | Stop reason: HOSPADM

## 2022-04-05 RX ORDER — ROCURONIUM BROMIDE 10 MG/ML
INJECTION, SOLUTION INTRAVENOUS AS NEEDED
Status: DISCONTINUED | OUTPATIENT
Start: 2022-04-05 | End: 2022-04-05 | Stop reason: SURG

## 2022-04-05 RX ORDER — GLYCOPYRROLATE 0.2 MG/ML
0.2 INJECTION INTRAMUSCULAR; INTRAVENOUS
Status: COMPLETED | OUTPATIENT
Start: 2022-04-05 | End: 2022-04-05

## 2022-04-05 RX ADMIN — SODIUM CHLORIDE, POTASSIUM CHLORIDE, SODIUM LACTATE AND CALCIUM CHLORIDE: 600; 310; 30; 20 INJECTION, SOLUTION INTRAVENOUS at 13:01

## 2022-04-05 RX ADMIN — DEXAMETHASONE SODIUM PHOSPHATE 8 MG: 4 INJECTION INTRA-ARTICULAR; INTRALESIONAL; INTRAMUSCULAR; INTRAVENOUS; SOFT TISSUE at 11:55

## 2022-04-05 RX ADMIN — FENTANYL CITRATE 50 MCG: 50 INJECTION, SOLUTION INTRAMUSCULAR; INTRAVENOUS at 11:55

## 2022-04-05 RX ADMIN — PROPOFOL 200 MG: 10 INJECTION, EMULSION INTRAVENOUS at 11:49

## 2022-04-05 RX ADMIN — ACETAMINOPHEN 1000 MG: 500 TABLET ORAL at 10:44

## 2022-04-05 RX ADMIN — ROCURONIUM BROMIDE 50 MG: 10 INJECTION INTRAVENOUS at 11:49

## 2022-04-05 RX ADMIN — SUGAMMADEX 200 MG: 100 INJECTION, SOLUTION INTRAVENOUS at 13:22

## 2022-04-05 RX ADMIN — CLINDAMYCIN IN 5 PERCENT DEXTROSE 900 MG: 18 INJECTION, SOLUTION INTRAVENOUS at 11:49

## 2022-04-05 RX ADMIN — SODIUM CHLORIDE, POTASSIUM CHLORIDE, SODIUM LACTATE AND CALCIUM CHLORIDE 9 ML/HR: 600; 310; 30; 20 INJECTION, SOLUTION INTRAVENOUS at 10:44

## 2022-04-05 RX ADMIN — LIDOCAINE HYDROCHLORIDE 20 MG: 20 INJECTION, SOLUTION INFILTRATION; PERINEURAL at 12:22

## 2022-04-05 RX ADMIN — FENTANYL CITRATE 50 MCG: 50 INJECTION, SOLUTION INTRAMUSCULAR; INTRAVENOUS at 11:49

## 2022-04-05 RX ADMIN — MIDAZOLAM HYDROCHLORIDE 2 MG: 1 INJECTION, SOLUTION INTRAMUSCULAR; INTRAVENOUS at 11:31

## 2022-04-05 RX ADMIN — ONDANSETRON 4 MG: 2 INJECTION INTRAMUSCULAR; INTRAVENOUS at 13:03

## 2022-04-05 RX ADMIN — LIDOCAINE HYDROCHLORIDE 80 MG: 20 INJECTION, SOLUTION INFILTRATION; PERINEURAL at 11:49

## 2022-04-05 RX ADMIN — GLYCOPYRROLATE 0.2 MG: 0.2 INJECTION INTRAMUSCULAR; INTRAVENOUS at 11:31

## 2022-04-05 NOTE — ANESTHESIA POSTPROCEDURE EVALUATION
Patient: Eric Pal    Procedure Summary     Date: 04/05/22 Room / Location: [facility] OR [facility] / [facility]    Anesthesia Start: 1146 Anesthesia Stop: 1341    Procedure: FUNCTIONAL ENDOSCOPIC SINUS SURGERY WITH MAXILLARY ANTROSTOMY (N/A Head) Diagnosis:       Chronic rhinosinusitis      Nasal septal deviation      (Chronic rhinosinusitis [J31.0, J32.9])      (Nasal septal deviation [J34.2])    Surgeons: Jay Rutledge MD Provider: Vazquez Cleveland MD    Anesthesia Type: general ASA Status: 3          Anesthesia Type: general    Vitals  Vitals Value Taken Time   /83 04/05/22 1358   Temp 36.1 °C (97 °F) 04/05/22 1342   Pulse 73 04/05/22 1400   Resp 12 04/05/22 1342   SpO2 90 % 04/05/22 1400   Vitals shown include unvalidated device data.        Post Anesthesia Care and Evaluation    Patient location during evaluation: bedside  Patient participation: complete - patient participated  Level of consciousness: awake  Pain management: adequate  Airway patency: patent  Anesthetic complications: No anesthetic complications  PONV Status: none  Cardiovascular status: acceptable and stable  Respiratory status: acceptable  Hydration status: acceptable    Comments: An Anesthesiologist personally participated in the most demanding procedures (including induction and emergence if applicable) in the anesthesia plan, monitored the course of anesthesia administration at frequent intervals and remained physically present and available for immediate diagnosis and treatment of emergencies.

## 2022-04-05 NOTE — ANESTHESIA PREPROCEDURE EVALUATION
Anesthesia Evaluation     Patient summary reviewed and Nursing notes reviewed   no history of anesthetic complications:  NPO Solid Status: > 8 hours  NPO Liquid Status: > 2 hours           Airway   Mallampati: II  TM distance: >3 FB  Neck ROM: full  No difficulty expected  Dental      Pulmonary - negative pulmonary ROS and normal exam    breath sounds clear to auscultation    ROS comment: sarcoidosis  Cardiovascular - normal exam  Exercise tolerance: good (4-7 METS)    Rhythm: regular  Rate: normal    (+) hyperlipidemia,       Neuro/Psych  (+) CVA (no residual, 2017, only ASA),    GI/Hepatic/Renal/Endo      Musculoskeletal     Abdominal    Substance History      OB/GYN          Other                        Anesthesia Plan    ASA 3     general       Anesthetic plan, all risks, benefits, and alternatives have been provided, discussed and informed consent has been obtained with: patient.        CODE STATUS:

## 2022-04-05 NOTE — DISCHARGE INSTRUCTIONS
DISCHARGE INSTRUCTIONS NASAL/SINUS SURGERY      For your surgery you had:  General anesthesia (you may have a sore throat for the first 24 hours)  IV sedation  Local anesthesia  Monitored anesthesia care  You received a medicated patch for nausea prevention today (behind your ear). It is recommended that you remove it 24-48 hours post-operatively. It must be removed within 72 hours.   You have received an anesthesia medication today that can cause hormonal forms of birth control to be ineffective. You should use a different form of birth control (to prevent pregnancy) for 7 days.  You may experience dizziness, drowsiness, or lightheadedness for several hours following surgery.  Do not stay alone today or tonight.  Limit your activity for 24 hours.  You should not drive, operate machinery, drink alcohol,or sign legally binding documents for 24 hours or while you are taking pain medication.  Resume your diet slowly.  Follow any special dietary instructions you may have been given by your doctor.    Last dose of pain medication was given at:  .  NOTIFY YOUR DOCTOR IF YOU EXPERIENCE ANY OF THE FOLLOWING:  Temperature greater than 101 degrees Fahrenheit  Shaking Chills  Redness or excessive drainage from incision  Nausea, vomiting and/or pain that is not controlled by prescribed medications  Increase in bleeding or bleeding that is excessive  Unable to urinate in 6 hours after surgery  If unable to reach your doctor, please go to the closest Emergency Room. Change gauze pad under nose as necessary.  Notify the surgeon of excessive bleeding.  Ice packs for at least 24 hours to lessen swelling and bruising if desired.  Rest/sleep with head elevated on 2 to 3 pillows.                           Mouth care is acceptable for relief of dry mouth.  You may also use a cool mist humidifier in the room.  Your doctor will tell you when packing/splint will be removed.  Do not blow your nose.  If you must sneeze, do so with your  mouth open.                 Avoid heavy lifting or straining, limit bending.  Medications per physician instructions as indicated on Discharge Medication Information Sheet.  You should see  for follow-up care   on ________________________________ .  Phone number: ____________________________________      SPECIAL INSTRUCTIONS:

## 2022-04-05 NOTE — OP NOTE
ENDOSCOPIC FUNCTIONAL SINUS SURGERY WITH TRACKING  Procedure Report    Patient Name:  Eric Pal  YOB: 1965    Date of Surgery:  4/5/2022     Indications:      Pre-op Diagnosis:   Chronic rhinosinusitis [J31.0, J32.9]  Nasal septal deviation [J34.2]       Post-Op Diagnosis Codes:     * Chronic rhinosinusitis [J31.0, J32.9]     * Nasal septal deviation [J34.2]    Procedure/CPT® Codes:      Procedure(s):  FUNCTIONAL ENDOSCOPIC SINUS SURGERY WITH MICHELE MAXILLARY ANTROSTOMIES    Staff:  Surgeon(s):  Jay Rutledge MD         Anesthesia: Choice    Estimated Blood Loss: 100 mL    Implants:    Nothing was implanted during the procedure    Specimen:          None        Findings: Narrow nasal cavities bilaterally exacerbated by a slightly left septal deviation.  Purulence pooling in the bilateral maxillary antrostomies.    Complications: None    Description of Procedure:   The patient was brought back to the operating room and placed supine upon the table. General endotracheal anesthesia was successfully established and the patient's midface was prepped and draped in the usual manner for functional endoscopic sinus surgery. Afrin-soaked pledgets were inserted into the bilateral nasal cavities. After giving the medications ample time to take effect the Afrin pledgets were removed and the bilateral anterior insertion of the middle turbinate and around the sphenopalatine were infiltrated with 1% lidocaine with 1:100,000 epinephrine. A 30° rigid endoscope was inserted into the right nasal cavity.  This revealed a small superior maxillary antrostomy with purulence pooling in the maxillary sinus.  This was opened using a combination of side biters and backbiters as well as the microdebrider.  This was taken down to the level of the floor of the nose.  The stump of the inferior turbinate both anteriorly and posteriorly was cauterized with suction cautery.  Afrin-soaked pledgets were inserted.     A  30° rigid endoscope was inserted into the left nasal cavity.  This revealed a small superior maxillary antrostomy with purulence pooling in the maxillary sinus.  This was opened using a combination of side biters and backbiters as well as the microdebrider.  This was taken down to the level of the floor of the nose.  The stump of the inferior turbinate both anteriorly and posteriorly was cauterized with suction cautery.  Afrin-soaked pledgets were inserted.    After giving the medication ample time to take effect Afrin-soaked pledgets were removed from the bilateral nasal cavities. The nasal cavities were irrigated profusely with sterile saline. An orogastric tube was inserted and used to evacuate the patient's stomach contents. The patient was then returned to the care of anesthesia. The patient tolerated the procedure well and was transferred to the recovery room in satisfactory condition without apparent complication.          Jay Rutledge MD     Date: 4/5/2022  Time: 13:37 EDT

## 2022-04-05 NOTE — INTERVAL H&P NOTE
H&P reviewed.  The patient was examined and there are no changes to the H&P. His allergies were reviewed. We again discussed the risks, benefits, and alternatives. Heart: regular rate and rhythm. Lungs: clear to auscultation bilaterally.

## 2022-04-13 ENCOUNTER — OFFICE VISIT (OUTPATIENT)
Dept: OTOLARYNGOLOGY | Facility: CLINIC | Age: 57
End: 2022-04-13

## 2022-04-13 VITALS — TEMPERATURE: 97.5 F | BODY MASS INDEX: 31.29 KG/M2 | HEIGHT: 74 IN | WEIGHT: 243.8 LBS

## 2022-04-13 DIAGNOSIS — J31.0 CHRONIC RHINOSINUSITIS: Primary | ICD-10-CM

## 2022-04-13 DIAGNOSIS — J32.9 CHRONIC RHINOSINUSITIS: Primary | ICD-10-CM

## 2022-04-13 DIAGNOSIS — J34.2 NASAL SEPTAL DEVIATION: ICD-10-CM

## 2022-04-13 PROCEDURE — 99024 POSTOP FOLLOW-UP VISIT: CPT | Performed by: OTOLARYNGOLOGY

## 2022-04-13 NOTE — PROGRESS NOTES
Patient Name: Eric Pal   Visit Date: 04/13/2022   Patient ID: [id number]  Provider: Jay Rutledge MD    Sex: male  Location: [facility]   YOB: 1965  Location Address: [address] [place] [address], [address],?[place]?[address]    Primary Care Provider Tawanna Real APRN  Location Phone: [phone]    Referring Provider: No ref. provider found        Chief Complaint  1 week post op    History of Present Illness  Eric Pal is a 57 y.o. male who presents to [facility] today as a consult from No ref. provider found for evaluation of his nose.  He was originally seen by STACIE Berry on 2/14/2022 at which time he reported that green to yellow rhinorrhea which was positional since [facility].  He also reported anosmia since surgery but was not have any difficulty with congestion.  He was placed on a 10-day course of Keflex.    He returns today for follow-up status post bilateral functional endoscopic sinus surgery with maxillary megaantrostomies on 4/5/2022. He tells me that he has done well since surgery.  He has been doing his rinses and has finished the Cipro.  He denies any bleeding, scabbing, or discolored rhinorrhea.    Past Medical History:   Diagnosis Date   • Hyperlipidemia    • Sarcoidosis     PER PT NO CURRENT PROBLEMS AT THIS TIME   • Sinusitis    • Stroke (HCC) 2017    MINI TIA- NO RESIDUAL- FOLLOWS WITH A NEUROLOGIST [facility]       Past Surgical History:   Procedure Laterality Date   • ENDOSCOPIC FUNCTIONAL SINUS SURGERY (FESS) N/A 4/5/2022    Procedure: FUNCTIONAL ENDOSCOPIC SINUS SURGERY WITH MAXILLARY ANTROSTOMY;  Surgeon: Jay Rutledge MD;  [place] [facility] [place] [facility] [place]  Service: [place];  Laterality: N/A;   • ENDOSCOPIC FUNCTIONAL SINUS SURGERY (FESS)  4/5/2022    Procedure: ;  Surgeon: Jay Rutledge MD;  [place] [facility] [place] [facility] [place]  Service:  "ENT;;   • SINUS SURGERY           Current Outpatient Medications:   •  albuterol (PROVENTIL) (2.5 MG/3ML) 0.083% nebulizer solution, Take 2.5 mg by nebulization Every 4 (Four) Hours As Needed for Wheezing., Disp: , Rfl:   •  albuterol sulfate  (90 Base) MCG/ACT inhaler, Inhale 2 puffs Every 4 (Four) Hours As Needed for Wheezing., Disp: , Rfl:   •  aspirin 81 MG EC tablet, Take 81 mg by mouth Daily. PT REPORTED DR TELLEZ WAS AWARE PT ALREADY STOPPED ASA WHEN SEEN NEUROLOGY AT [facility] (MD FOLLOWS UP WITH FOR STROKE) ON 3-17-22, LAST DOSE WAS 3-17-22, Disp: , Rfl:   •  budesonide-formoterol (Symbicort) 160-4.5 MCG/ACT inhaler, Inhale 2 puffs 2 (Two) Times a Day., Disp: 10.2 g, Rfl: 1  •  fluticasone (FLONASE) 50 MCG/ACT nasal spray, 2 sprays into the nostril(s) as directed by provider Daily., Disp: , Rfl:   •  ibuprofen (ADVIL,MOTRIN) 800 MG tablet, Take 800 mg by mouth 3 (Three) Times a Day As Needed for Mild Pain ., Disp: , Rfl:   •  montelukast (SINGULAIR) 10 MG tablet, Take 10 mg by mouth Daily., Disp: , Rfl:   •  Refresh 1.4-0.6 % ophthalmic solution, Administer 1 drop to both eyes 2 (Two) Times a Day., Disp: , Rfl:   •  rosuvastatin (CRESTOR) 20 MG tablet, Take 40 mg by mouth Daily., Disp: , Rfl:   •  ciprofloxacin (CIPRO) 500 MG tablet, Take 1 tablet by mouth Every 12 (Twelve) Hours for 10 days., Disp: 20 tablet, Rfl: 0     Allergies   Allergen Reactions   • Penicillins Rash       Social History     Tobacco Use   • Smoking status: Never Smoker   • Smokeless tobacco: Never Used   Vaping Use   • Vaping Use: Never used   Substance Use Topics   • Alcohol use: Never   • Drug use: Never        Objective     Vital Signs:   Temp 97.5 °F (36.4 °C) (Temporal)   Ht 188 cm (74\")   Wt 111 kg (243 lb 12.8 oz)   BMI 31.30 kg/m²       Physical Exam    General: Well developed, well nourished patient of stated age in no acute distress. Voice is strong and clear.   Head: Normocephalic and atraumatic.  Face: No lesions.  " Bilateral parotid and submandibular glands are unremarkable.  Stensen's and Warthin's ducts are productive of clear saliva bilaterally.  House-Brackmann I/VI     bilaterally.   muscles and temporomandibular joint nontender to palpation.  No TMJ crepitus.  Eyes: PERRLA, sclerae anicteric, no conjunctival injection. Extra ocular movements are intact and full. No nystagmus.   Ears: Auricles are normal in appearance. Bilateral external auditory canals are unremarkable. Bilateral tympanic membranes are clear and without effusion. Hearing normal to conversational voice.   Nose: External nose is normal in appearance. Bilateral nares are patent with mildly edematous nasal mucosa. Septum deviated to the left with some crusting present bilaterally.  Turbinates are unremarkable. No lesions.   Oral Cavity: Lips are normal in appearance. Oral mucosa is unremarkable. Gingiva is unremarkable. Normal dentition for age. Tongue is unremarkable with good movement. Hard palate is unremarkable.   Oropharynx: Soft palate is unremarkable with full movement. Uvula is unremarkable. Bilateral tonsils are unremarkable. Posterior oropharynx is unremarkable.    Larynx and hypopharynx: Deferred secondary to gag reflex.  Neck: Supple.  No mass.  Nontender to palpation.  Trachea midline. Thyroid normal size and without nodules to palpation.   Lymphatic: No lymphadenopathy upon palpation.   Psychiatric: Appropriate affect, cooperative   Neurologic: Oriented x 3, strength symmetric in all extremities, Cranial Nerves II-XII are grossly intact to confrontation   Skin: Warm and dry. No rashes.    Procedures       Result Review :               Assessment and Plan    Diagnoses and all orders for this visit:    1. Chronic rhinosinusitis (Primary)    2. Nasal septal deviation    Impressions and findings were discussed with Mr. Pal at great length.  Currently, he is healing well status post endoscopic bilateral megaantrostomies.  We discussed  continued postoperative care and he will follow up in 6 weeks or sooner if needed.    Follow Up   Return in about 6 weeks (around 5/25/2022).  Patient was given instructions and counseling regarding his condition or for health maintenance advice. Please see specific information pulled into the AVS if appropriate.

## 2022-04-18 LAB — QT INTERVAL: 411 MS

## 2022-05-02 RX ORDER — BUDESONIDE AND FORMOTEROL FUMARATE DIHYDRATE 160; 4.5 UG/1; UG/1
AEROSOL RESPIRATORY (INHALATION)
Qty: 10.2 G | Refills: 1 | Status: SHIPPED | OUTPATIENT
Start: 2022-05-02

## 2022-05-20 RX ORDER — LEVETIRACETAM 500 MG/1
TABLET ORAL
COMMUNITY
Start: 2022-04-20 | End: 2022-10-19 | Stop reason: ALTCHOICE

## 2022-05-20 RX ORDER — LORATADINE/PSEUDOEPHEDRINE 10MG-240MG
1 TABLET, EXTENDED RELEASE 24 HR ORAL DAILY
COMMUNITY
Start: 2022-04-25

## 2022-05-25 ENCOUNTER — OFFICE VISIT (OUTPATIENT)
Dept: OTOLARYNGOLOGY | Facility: CLINIC | Age: 57
End: 2022-05-25

## 2022-05-25 VITALS — BODY MASS INDEX: 30.93 KG/M2 | HEIGHT: 74 IN | WEIGHT: 241 LBS | TEMPERATURE: 96.7 F

## 2022-05-25 DIAGNOSIS — J32.9 CHRONIC RHINOSINUSITIS: Primary | ICD-10-CM

## 2022-05-25 DIAGNOSIS — J34.2 NASAL SEPTAL DEVIATION: ICD-10-CM

## 2022-05-25 DIAGNOSIS — J31.0 CHRONIC RHINOSINUSITIS: Primary | ICD-10-CM

## 2022-05-25 PROCEDURE — 99212 OFFICE O/P EST SF 10 MIN: CPT | Performed by: OTOLARYNGOLOGY

## 2022-05-25 RX ORDER — DOXYCYCLINE HYCLATE 100 MG/1
100 CAPSULE ORAL 2 TIMES DAILY
Qty: 20 CAPSULE | Refills: 0 | Status: SHIPPED | OUTPATIENT
Start: 2022-05-25 | End: 2022-06-04

## 2022-05-25 RX ORDER — SILDENAFIL 100 MG/1
TABLET, FILM COATED ORAL AS NEEDED
COMMUNITY
Start: 2022-05-21 | End: 2023-01-12

## 2022-05-25 NOTE — PROGRESS NOTES
Patient Name: Eric Pal   Visit Date: 05/25/2022   Patient ID: [id number]  Provider: Jay Rutledge MD    Sex: male  [place] [facility]   YOB: 1965  Location Address: [address] [place] [address], [address],?[place]?[address]    Primary Care Provider Tawanna Real APRN  Location Phone: [phone]    Referring Provider: No ref. provider found        Chief Complaint  6 week follow up    History of Present Illness  Eric Pal is a 57 y.o. male who presents to [facility] today as a consult from No ref. provider found for evaluation of his nose.  He was originally seen by STACIE Berry on 2/14/2022 at which time he reported that green to yellow rhinorrhea which was positional since [facility].  He also reported anosmia since surgery but was not have any difficulty with congestion.  He was placed on a 10-day course of Keflex.    He returns today for follow-up status post bilateral functional endoscopic sinus surgery with maxillary megaantrostomies on 4/5/2022. He was last seen on 4/13/2022 which time he was doing well. He is doing well without drainage.  He is breathing well through his nose.  He is using his saline irrigations daily.     Past Medical History:   Diagnosis Date   • Hyperlipidemia    • Sarcoidosis     PER PT NO CURRENT PROBLEMS AT THIS TIME   • Sinusitis    • Stroke (HCC) 2017    MINI TIA- NO RESIDUAL- FOLLOWS WITH A NEUROLOGIST AT [facility]       Past Surgical History:   Procedure Laterality Date   • ENDOSCOPIC FUNCTIONAL SINUS SURGERY (FESS) N/A 4/5/2022    Procedure: FUNCTIONAL ENDOSCOPIC SINUS SURGERY WITH MAXILLARY ANTROSTOMY;  Surgeon: Jay Rutledge MD;  Location[place] [facility] [place] [facility] [place]  Service: ENT;  Laterality: N/A;   • ENDOSCOPIC FUNCTIONAL SINUS SURGERY (FESS)  4/5/2022    Procedure: ;  Surgeon: Jay Rutledge MD;  Location: [facility] OR  [facility];  Service: ENT;;   • SINUS SURGERY           Current Outpatient Medications:   •  albuterol (PROVENTIL) (2.5 MG/3ML) 0.083% nebulizer solution, Take 2.5 mg by nebulization Every 4 (Four) Hours As Needed for Wheezing., Disp: , Rfl:   •  albuterol sulfate  (90 Base) MCG/ACT inhaler, Inhale 2 puffs Every 4 (Four) Hours As Needed for Wheezing., Disp: , Rfl:   •  aspirin 81 MG EC tablet, Take 81 mg by mouth Daily. PT REPORTED DR TELLEZ WAS AWARE PT ALREADY STOPPED ASA WHEN SEEN NEUROLOGY AT [facility] (MD FOLLOWS UP WITH FOR STROKE) ON 3-17-22, LAST DOSE WAS 3-17-22, Disp: , Rfl:   •  budesonide-formoterol (SYMBICORT) 160-4.5 MCG/ACT inhaler, INHALE 2 PUFFS BY MOUTH TWICE DAILY, Disp: 10.2 g, Rfl: 1  •  fluticasone (FLONASE) 50 MCG/ACT nasal spray, 2 sprays into the nostril(s) as directed by provider Daily., Disp: , Rfl:   •  ibuprofen (ADVIL,MOTRIN) 800 MG tablet, Take 800 mg by mouth 3 (Three) Times a Day As Needed for Mild Pain ., Disp: , Rfl:   •  levETIRAcetam (KEPPRA) 500 MG tablet, , Disp: , Rfl:   •  Loratadine-D 24HR  MG per 24 hr tablet, , Disp: , Rfl:   •  montelukast (SINGULAIR) 10 MG tablet, Take 10 mg by mouth Daily., Disp: , Rfl:   •  Refresh 1.4-0.6 % ophthalmic solution, Administer 1 drop to both eyes 2 (Two) Times a Day., Disp: , Rfl:   •  rosuvastatin (CRESTOR) 20 MG tablet, Take 40 mg by mouth Daily., Disp: , Rfl:   •  sildenafil (VIAGRA) 100 MG tablet, , Disp: , Rfl:   •  doxycycline (VIBRAMYCIN) 100 MG capsule, Take 1 capsule by mouth 2 (Two) Times a Day for 10 days., Disp: 20 capsule, Rfl: 0     Allergies   Allergen Reactions   • Penicillins Rash       Social History     Tobacco Use   • Smoking status: Never Smoker   • Smokeless tobacco: Never Used   Vaping Use   • Vaping Use: Never used   Substance Use Topics   • Alcohol use: Never   • Drug use: Never        Objective     Vital Signs:   Temp 96.7 °F (35.9 °C) (Temporal)   Ht 188 cm (74\")   Wt 109 kg (241 lb)   BMI 30.94 kg/m²   "     Physical Exam    General: Well developed, well nourished patient of stated age in no acute distress. Voice is strong and clear.   Head: Normocephalic and atraumatic.  Face: No lesions.  Bilateral parotid and submandibular glands are unremarkable.  Stensen's and Warthin's ducts are productive of clear saliva bilaterally.  House-Brackmann I/VI     bilaterally.   muscles and temporomandibular joint nontender to palpation.  No TMJ crepitus.  Eyes: PERRLA, sclerae anicteric, no conjunctival injection. Extra ocular movements are intact and full. No nystagmus.   Ears: Auricles are normal in appearance. Bilateral external auditory canals are unremarkable. Bilateral tympanic membranes are clear and without effusion. Hearing normal to conversational voice.   Nose: External nose is normal in appearance. Bilateral nares are patent with mildly edematous nasal mucosa. Septum deviated to the left with some crusting present bilaterally.  A 0 degree endoscope was inserted revealing mild crusting in the bilateral maxillary antrostomies.  Turbinates are unremarkable. No lesions.   Oral Cavity: Lips are normal in appearance. Oral mucosa is unremarkable. Gingiva is unremarkable. Normal dentition for age. Tongue is unremarkable with good movement. Hard palate is unremarkable.   Oropharynx: Soft palate is unremarkable with full movement. Uvula is unremarkable. Bilateral tonsils are unremarkable. Posterior oropharynx is unremarkable.    Larynx and hypopharynx: Deferred secondary to gag reflex.  Neck: Supple.  No mass.  Nontender to palpation.  Trachea midline. Thyroid normal size and without nodules to palpation.   Lymphatic: No lymphadenopathy upon palpation.   Psychiatric: Appropriate affect, cooperative   Neurologic: Oriented x 3, strength symmetric in all extremities, Cranial Nerves II-XII are grossly intact to confrontation   Skin: Warm and dry. No rashes.    Procedures       Result Review :               Assessment and  Plan    Diagnoses and all orders for this visit:    1. Chronic rhinosinusitis (Primary)  -     doxycycline (VIBRAMYCIN) 100 MG capsule; Take 1 capsule by mouth 2 (Two) Times a Day for 10 days.  Dispense: 20 capsule; Refill: 0    2. Nasal septal deviation    Impressions and findings were discussed with Mr. Pal at great length.  Currently, he is well-healed status post endoscopic bilateral megaantrostomies.  Endoscopic examination today reveals mild crusting in the areas of the bilateral maxillary antrostomies for which she will be placed on a course of doxycycline.  We discussed increasing his saline irrigations and he may follow-up with me on an as-needed basis.      Follow Up   Return if symptoms worsen or fail to improve.  Patient was given instructions and counseling regarding his condition or for health maintenance advice. Please see specific information pulled into the AVS if appropriate.

## 2022-08-19 ENCOUNTER — OFFICE VISIT (OUTPATIENT)
Dept: INTERNAL MEDICINE | Facility: CLINIC | Age: 57
End: 2022-08-19

## 2022-08-19 VITALS
DIASTOLIC BLOOD PRESSURE: 80 MMHG | BODY MASS INDEX: 31.06 KG/M2 | WEIGHT: 242 LBS | OXYGEN SATURATION: 96 % | HEIGHT: 74 IN | TEMPERATURE: 97.4 F | SYSTOLIC BLOOD PRESSURE: 132 MMHG | HEART RATE: 64 BPM

## 2022-08-19 DIAGNOSIS — M72.2 PLANTAR FASCIITIS OF RIGHT FOOT: Primary | ICD-10-CM

## 2022-08-19 PROCEDURE — 99213 OFFICE O/P EST LOW 20 MIN: CPT

## 2022-08-19 NOTE — PROGRESS NOTES
"Chief Complaint  Foot Pain (Right foot heel spur for over two weeks)    Subjective          Eric Pal presents to [facility]  History of Present Illness    Eric is here to discuss right foot pain for over two weeks. He said the pain has been present mainly in the heel of his foot. He has been taking ibuprofen/aleve. He denies any injury to foot. Has not tried any ice or anything on foot.     Objective   Vital Signs:   /80 (BP Location: Left arm, Patient Position: Sitting, Cuff Size: Adult)   Pulse 64   Temp 97.4 °F (36.3 °C) (Temporal)   Ht 188 cm (74\")   Wt 110 kg (242 lb)   SpO2 96%   BMI 31.07 kg/m²     Physical Exam  Vitals reviewed.   Constitutional:       Appearance: Normal appearance. He is well-developed.   HENT:      Head: Normocephalic and atraumatic.      Mouth/Throat:      Pharynx: No oropharyngeal exudate.   Eyes:      Conjunctiva/sclera: Conjunctivae normal.      Pupils: Pupils are equal, round, and reactive to light.   Cardiovascular:      Rate and Rhythm: Normal rate and regular rhythm.      Heart sounds: No murmur heard.    No friction rub. No gallop.   Pulmonary:      Effort: Pulmonary effort is normal.      Breath sounds: Normal breath sounds. No wheezing or rhonchi.   Musculoskeletal:        Feet:    Feet:      Comments: Area of indicated tenderness. No visual abnormalities to foot. No swelling/bruising.   Skin:     General: Skin is warm and dry.   Neurological:      Mental Status: He is alert and oriented to person, place, and time.   Psychiatric:         Mood and Affect: Affect normal.        Result Review :          Procedures      Assessment and Plan    Diagnoses and all orders for this visit:    1. Plantar fasciitis of right foot (Primary)  Comments:  Stretches for foot provided as well as description of icing/stretching. Voltaren gel sent for pain relief. Will send to podiatry per patient request.   Orders:  -     " Diclofenac Sodium (VOLTAREN) 1 % gel gel; Apply 4 g topically to the appropriate area as directed 4 (Four) Times a Day As Needed (foot pain).  Dispense: 350 g; Refill: 0  -     Ambulatory Referral to Podiatry    Patient was instructed and educated on their diagnoses and treatment plan prior to leaving the office. If symptoms worsen or persist, seek emergency medical attention. Take all medications as prescribed. Call the office if any questions or concerns arise.             Follow Up   No follow-ups on file.  Patient was given instructions and counseling regarding his condition or for health maintenance advice. Please see specific information pulled into the AVS if appropriate.

## 2022-10-19 ENCOUNTER — OFFICE VISIT (OUTPATIENT)
Dept: PODIATRY | Facility: CLINIC | Age: 57
End: 2022-10-19

## 2022-10-19 VITALS
BODY MASS INDEX: 31.06 KG/M2 | HEART RATE: 60 BPM | TEMPERATURE: 97.3 F | HEIGHT: 74 IN | WEIGHT: 242 LBS | OXYGEN SATURATION: 96 % | DIASTOLIC BLOOD PRESSURE: 83 MMHG | SYSTOLIC BLOOD PRESSURE: 132 MMHG

## 2022-10-19 DIAGNOSIS — M72.2 PLANTAR FASCIITIS: ICD-10-CM

## 2022-10-19 DIAGNOSIS — M79.671 FOOT PAIN, RIGHT: Primary | ICD-10-CM

## 2022-10-19 PROCEDURE — 20550 NJX 1 TENDON SHEATH/LIGAMENT: CPT | Performed by: PODIATRIST

## 2022-10-19 PROCEDURE — 99203 OFFICE O/P NEW LOW 30 MIN: CPT | Performed by: PODIATRIST

## 2022-10-19 RX ORDER — DEXAMETHASONE SODIUM PHOSPHATE 4 MG/ML
2 INJECTION, SOLUTION INTRA-ARTICULAR; INTRALESIONAL; INTRAMUSCULAR; INTRAVENOUS; SOFT TISSUE ONCE
Status: COMPLETED | OUTPATIENT
Start: 2022-10-19 | End: 2022-10-19

## 2022-10-19 RX ORDER — TRIAMCINOLONE ACETONIDE 40 MG/ML
20 INJECTION, SUSPENSION INTRA-ARTICULAR; INTRAMUSCULAR ONCE
Status: COMPLETED | OUTPATIENT
Start: 2022-10-19 | End: 2022-10-19

## 2022-10-19 RX ORDER — LIDOCAINE HYDROCHLORIDE 10 MG/ML
0.5 INJECTION, SOLUTION INFILTRATION; PERINEURAL ONCE
Status: COMPLETED | OUTPATIENT
Start: 2022-10-19 | End: 2022-10-19

## 2022-10-19 RX ADMIN — DEXAMETHASONE SODIUM PHOSPHATE 2 MG: 4 INJECTION, SOLUTION INTRA-ARTICULAR; INTRALESIONAL; INTRAMUSCULAR; INTRAVENOUS; SOFT TISSUE at 08:48

## 2022-10-19 RX ADMIN — TRIAMCINOLONE ACETONIDE 20 MG: 40 INJECTION, SUSPENSION INTRA-ARTICULAR; INTRAMUSCULAR at 08:49

## 2022-10-19 RX ADMIN — LIDOCAINE HYDROCHLORIDE 0.5 ML: 10 INJECTION, SOLUTION INFILTRATION; PERINEURAL at 08:49

## 2022-10-19 NOTE — PROGRESS NOTES
[facility] - PODIATRY    Today's Date: 10/19/22    Patient Name: Eric Pal  MRN: [id number]  CSN: [id number]  PCP: Tawanna Real APRN  Referring Provider: Tammie Washburn APRN    SUBJECTIVE     Chief Complaint   Patient presents with   • Right Foot - Establish Care, Pain     [facility], saw Tammie Washburn (note of chart)     HPI: Eric Pal, a 57 y.o.male, comes to clinic.    New, Established, New Problem: New    Location: Right heel    Duration: September 2022    Onset:  gradual    Nature:  achy, sharp, shooting    Stable, worsening, improving: Worsening    Aggravating factors:  Patient describes morning right heel pain as stabbing, burning, or aching. This pain usually subsides throughout the day, however it returns after periods of rest and sitting, when standing back up on their feet, and again the next morning.      Previous Treatment: Taking ibuprofen 800 3 times daily as needed, was prescribed topical Voltaren gel, [facility] but the images and results are not available at this time, [facility]    Patient denies any fevers, chills, nausea, vomiting, shortness of breath, nor any other constitutional signs nor symptoms.      Past Medical History:   Diagnosis Date   • Foot pain, right    • Hyperlipidemia    • Sarcoidosis     PER PT NO CURRENT PROBLEMS AT THIS TIME   • Sinusitis    • Stroke (HCC) 2017    MINI TIA- NO RESIDUAL- FOLLOWS WITH A NEUROLOGIST [facility]     Past Surgical History:   Procedure Laterality Date   • ENDOSCOPIC FUNCTIONAL SINUS SURGERY (FESS) N/A 4/5/2022    Procedure: FUNCTIONAL ENDOSCOPIC SINUS SURGERY WITH MAXILLARY ANTROSTOMY;  Surgeon: Jay Rutledge MD;  [place] [facility] [place] [facility] [place]  Service: E[place];  Laterality: N/A;   • ENDOSCOPIC FUNCTIONAL SINUS SURGERY (FESS)  4/5/2022    Procedure: ;  Surgeon: Jay Rutledge MD;  [place] [facility] [place] [facility] [place]   Service: ENT;;   • SINUS SURGERY       Family History   Problem Relation Age of Onset   • Hypertension Mother    • Prostate cancer Father    • Diabetes Sister    • Cancer Brother    • Diabetes Brother    • Bone cancer Brother    • Malig Hyperthermia Neg Hx    • Heart disease Neg Hx    • Stroke Neg Hx      Social History     Socioeconomic History   • Marital status:    Tobacco Use   • Smoking status: Never   • Smokeless tobacco: Never   Vaping Use   • Vaping Use: Never used   Substance and Sexual Activity   • Alcohol use: Never   • Drug use: Never   • Sexual activity: Defer     Allergies   Allergen Reactions   • Penicillins Rash     Current Outpatient Medications   Medication Sig Dispense Refill   • albuterol (PROVENTIL) (2.5 MG/3ML) 0.083% nebulizer solution Take 2.5 mg by nebulization Every 4 (Four) Hours As Needed for Wheezing.     • albuterol sulfate  (90 Base) MCG/ACT inhaler Inhale 2 puffs Every 4 (Four) Hours As Needed for Wheezing.     • aspirin 81 MG EC tablet Take 81 mg by mouth Daily. PT REPORTED DR TELLEZ WAS AWARE PT ALREADY STOPPED ASA WHEN SEEN NEUROLOGY AT [facility] (MD FOLLOWS UP WITH FOR STROKE) ON 3-17-22, LAST DOSE WAS 3-17-22     • budesonide-formoterol (SYMBICORT) 160-4.5 MCG/ACT inhaler INHALE 2 PUFFS BY MOUTH TWICE DAILY 10.2 g 1   • Diclofenac Sodium (VOLTAREN) 1 % gel gel Apply 4 g topically to the appropriate area as directed 4 (Four) Times a Day As Needed (foot pain). 350 g 0   • fluticasone (FLONASE) 50 MCG/ACT nasal spray 2 sprays into the nostril(s) as directed by provider Daily.     • ibuprofen (ADVIL,MOTRIN) 800 MG tablet Take 800 mg by mouth 3 (Three) Times a Day As Needed for Mild Pain .     • Loratadine-D 24HR  MG per 24 hr tablet 1 tablet Daily.     • montelukast (SINGULAIR) 10 MG tablet Take 1 tablet by mouth As Needed.     • Refresh 1.4-0.6 % ophthalmic solution Administer 1 drop to both eyes 2 (Two) Times a Day.     • rosuvastatin (CRESTOR) 20 MG tablet Take 1  tablet by mouth Daily.     • sildenafil (VIAGRA) 100 MG tablet As Needed.       Current Facility-Administered Medications   Medication Dose Route Frequency Provider Last Rate Last Admin   • dexamethasone sodium phosphate injection 2 mg  2 mg Intramuscular Once Chandler Stovall DPM       • lidocaine (XYLOCAINE) 1 % injection 0.5 mL  0.5 mL Infiltration Once Chandler Stovall DPM       • triamcinolone acetonide (KENALOG-40) injection 20 mg  20 mg Intramuscular Once Chandler Stovall DPM         Review of Systems   Constitutional: Negative.    Musculoskeletal:        Right heel pain   All other systems reviewed and are negative.      OBJECTIVE     Vitals:    10/19/22 0747   BP: 132/83   Pulse: 60   Temp: 97.3 °F (36.3 °C)   SpO2: 96%       PHYSICAL EXAM     Foot/Ankle Exam:       General:   Appearance: appears stated age and healthy    Orientation: AAOx3    Affect: appropriate    Gait: unimpaired      VASCULAR      Right Foot Vascularity   Normal vascular exam    Dorsalis pedis:  2+  Posterior tibial:  2+  Skin Temperature: warm    Edema Grading:  None  CFT:  < 3 seconds  Pedal Hair Growth:  Present  Varicosities: none       Left Foot Vascularity   Normal vascular exam    Dorsalis pedis:  2+  Posterior tibial:  2+  Skin Temperature: warm    Edema Grading:  None  CFT:  < 3 seconds  Pedal Hair Growth:  Present  Varicosities: none        NEUROLOGIC     Right Foot Neurologic   Normal sensation    Light touch sensation:  Normal  Vibratory sensation:  Normal  Hot/Cold sensation: normal    Protective Sensation using [place] Monofilament:  10     Left Foot Neurologic   Normal sensation    Light touch sensation:  Normal  Vibratory sensation:  Normal  Hot/cold sensation: normal    Protective Sensation using [place] Monofilament:  10     MUSCULOSKELETAL      Right Foot Musculoskeletal   Tenderness: plantar fascia and plantar heel    Arch:  Pes planus     Left Foot Musculoskeletal   Arch:  Pes  planus     MUSCLE STRENGTH     Right Foot Muscle Strength   Foot dorsiflexion:  4  Foot plantar flexion:  4  Foot inversion:  4  Foot eversion:  4     Left Foot Muscle Strength   Foot dorsiflexion:  4  Foot plantar flexion:  4  Foot inversion:  4  Foot eversion:  4     RANGE OF MOTION      Right Foot Range of Motion   Foot and ankle ROM within normal limits       Left Foot Range of Motion   Foot and ankle ROM within normal limits       DERMATOLOGIC     Right Foot Dermatologic   Skin: skin intact    Nails: normal       Left Foot Dermatologic   Skin: skin intact    Nails: normal        RADIOLOGY:    XR Foot 3+ View Right    Result Date: 10/19/2022  Narrative: IN-OFFICE IMAGING:  Standing, weightbearing, 3 view, AP, MO, Lateral, right foot Indication: Heel pain Findings: Decreased calcaneal inclination angle with a rectus cyma line seen.  Degenerative changes from an apparent old fracture seen in the interphalangeal joint of the hallux.  Otherwise, no periosteal reactions nor osteolytic changes seen.  No occult fractures seen. Comparison: No comparison views available.        ASSESSMENT/PLAN     Diagnoses and all orders for this visit:    1. Foot pain, right (Primary)    2. Plantar fasciitis  -     lidocaine (XYLOCAINE) 1 % injection 0.5 mL  -     dexamethasone sodium phosphate injection 2 mg  -     triamcinolone acetonide (KENALOG-40) injection 20 mg      Comprehensive lower extremity examination and evaluation was performed.    Discussed findings and treatment plan including risks, benefits, and treatment options with patient in detail. Patient agreed with treatment plan.    Treatment Options discussed:  - no treatment at all  - change in shoegear  - change in activities  - RICE therapy  - arch support  - NSAIDs  - PO steroids  - injectable steroids    Plantar Fasciitis Injection  Date/Time: 10/19/2022  Performed by: Chandler Stovall DPM  Authorized by: Chandler Stovall DPM     Consent: Verbal consent obtained.  Written consent obtained.  Risks and benefits: risks, benefits and alternatives were discussed  Consent given by: patient  Patient identity confirmed: verbally with patient  Indications: pain relief    Injection site: Right heel.    Sedation:  Patient sedated: no    Patient position: sitting  Needle size: 27 G  Local anesthetic: 0.5 ml 1% Lidocaine plain, 0.5 ml Kenalog 40 mg/ml, and 0.5 ml Decadron 4 mg/mL.   Outcome: pain improved  Patient tolerance: Patient tolerated the procedure well with no immediate complications    Patient may begin to weight bear as tolerated in supportive shoes.  No impact activities for two weeks.  After that time, the patient may increase activities as tolerated. Patient states understanding and agreement with this plan.    An After Visit Summary was printed and given to the patient at discharge, including (if requested) any available informative/educational handouts regarding diagnosis, treatment, or medications. All questions were answered to patient/family satisfaction. Should symptoms fail to improve or worsen they agree to call or return to clinic or to go to the Emergency Department. Discussed the importance of following up with any needed screening tests/labs/specialist appointments and any requested follow-up recommended by me today. Importance of maintaining follow-up discussed and patient accepts that missed appointments can delay diagnosis and potentially lead to worsening of conditions.    Return if symptoms worsen or fail to improve, for Patient request PRN follow-up.  ., or sooner if acute issues arise.    This document has been electronically signed by Chandler Stovall DPM on October 19, 2022 08:49 EDT

## 2023-01-03 ENCOUNTER — TELEPHONE (OUTPATIENT)
Dept: INTERNAL MEDICINE | Facility: CLINIC | Age: 58
End: 2023-01-03
Payer: MEDICARE

## 2023-01-03 DIAGNOSIS — N52.9 ERECTILE DYSFUNCTION, UNSPECIFIED ERECTILE DYSFUNCTION TYPE: Primary | ICD-10-CM

## 2023-01-12 ENCOUNTER — OFFICE VISIT (OUTPATIENT)
Dept: UROLOGY | Facility: CLINIC | Age: 58
End: 2023-01-12
Payer: MEDICARE

## 2023-01-12 VITALS — HEIGHT: 74 IN | RESPIRATION RATE: 12 BRPM | WEIGHT: 242.6 LBS | BODY MASS INDEX: 31.13 KG/M2

## 2023-01-12 DIAGNOSIS — N52.9 ERECTILE DYSFUNCTION, UNSPECIFIED ERECTILE DYSFUNCTION TYPE: Primary | ICD-10-CM

## 2023-01-12 PROBLEM — R41.89 IMPAIRED COGNITION: Status: ACTIVE | Noted: 2018-10-16

## 2023-01-12 PROBLEM — D86.0 SARCOIDOSIS OF LUNG: Status: ACTIVE | Noted: 2018-06-01

## 2023-01-12 PROBLEM — Z98.890 OTHER SPECIFIED POSTPROCEDURAL STATES: Status: ACTIVE | Noted: 2022-01-19

## 2023-01-12 PROBLEM — I63.9 STROKE: Status: ACTIVE | Noted: 2017-09-18

## 2023-01-12 PROBLEM — G43.119 CLASSICAL MIGRAINE WITH INTRACTABLE MIGRAINE: Status: ACTIVE | Noted: 2018-10-16

## 2023-01-12 PROBLEM — I63.9 CEREBRAL INFARCTION (HCC): Status: ACTIVE | Noted: 2018-06-01

## 2023-01-12 PROBLEM — R06.02 SHORTNESS OF BREATH: Status: ACTIVE | Noted: 2023-01-12

## 2023-01-12 PROBLEM — G47.33 OBSTRUCTIVE SLEEP APNEA SYNDROME: Status: ACTIVE | Noted: 2023-01-12

## 2023-01-12 PROBLEM — E78.2 MIXED HYPERLIPIDEMIA: Status: ACTIVE | Noted: 2018-06-01

## 2023-01-12 PROBLEM — R56.9 SEIZURES: Status: ACTIVE | Noted: 2023-01-12

## 2023-01-12 PROCEDURE — 99214 OFFICE O/P EST MOD 30 MIN: CPT | Performed by: NURSE PRACTITIONER

## 2023-01-12 RX ORDER — ASPIRIN 325 MG
325 TABLET ORAL DAILY
COMMUNITY

## 2023-01-12 RX ORDER — TADALAFIL 20 MG/1
20 TABLET ORAL
COMMUNITY
End: 2023-01-12

## 2023-01-12 RX ORDER — AZELASTINE 1 MG/ML
2 SPRAY, METERED NASAL 2 TIMES DAILY
COMMUNITY

## 2023-01-12 RX ORDER — SILDENAFIL 100 MG/1
TABLET, FILM COATED ORAL
Qty: 30 TABLET | Refills: 4 | Status: SHIPPED | OUTPATIENT
Start: 2023-01-12

## 2023-01-12 NOTE — PROGRESS NOTES
Chief Complaint: Erectile Dysfunction (Pt here as a new pt.  )    Subjective         History of Present Illness  Eric Pal is a 57 y.o. male presents to [facility] to be seen for erectile dysfunction.    Patient reports for the past 2 weeks he has not been able to achieve or maintain an erection. Reports no other changes. After much discussion he does report this has been an ongoing problem.     He has tadalafil on his list but reports it is not doing anything. He was previously on viagra which he said did work and he would like to go back on this. He reports he was getting a 100mg tablet and cutting this in half and this was effective. He would like to go back on this.     He denies any heart, lung problems. Denies diabetes. He did have a CVA in 2017. He does take 2 ASA daily since the CVA.     He does have a history of sarcoidosis.     [facility] and goes in for f/u of those labs next week.     Objective     Past Medical History:   Diagnosis Date   • Foot pain, right    • Hyperlipidemia    • Sarcoidosis     PER PT NO CURRENT PROBLEMS AT THIS TIME   • Sinusitis    • Stroke (HCC) 2017    MINI TIA- NO RESIDUAL- FOLLOWS WITH A NEUROLOGIST AT [facility]       Past Surgical History:   Procedure Laterality Date   • ENDOSCOPIC FUNCTIONAL SINUS SURGERY (FESS) N/A 4/5/2022    Procedure: FUNCTIONAL ENDOSCOPIC SINUS SURGERY WITH MAXILLARY ANTROSTOMY;  Surgeon: Jay Rutledge MD;  [place] [facility] [place] [facility] [place]  Service: ENT;  Laterality: N/A;   • ENDOSCOPIC FUNCTIONAL SINUS SURGERY (FESS)  4/5/2022    Procedure: ;  Surgeon: Jay Rutledge MD;  [place]: [facility] [place] [facility] [place];  Service: ENT;;   • SINUS SURGERY           Current Outpatient Medications:   •  albuterol (PROVENTIL) (2.5 MG/3ML) 0.083% nebulizer solution, Take 2.5 mg by nebulization Every 4 (Four) Hours As Needed for Wheezing., Disp: , Rfl:   •  albuterol sulfate  (90 Base) MCG/ACT inhaler,  Inhale 2 puffs Every 4 (Four) Hours As Needed for Wheezing., Disp: , Rfl:   •  aspirin 325 MG tablet, Take 325 mg by mouth Daily., Disp: , Rfl:   •  aspirin 81 MG EC tablet, Take 81 mg by mouth Daily. PT REPORTED DR TELLEZ WAS AWARE PT ALREADY STOPPED ASA WHEN SEEN NEUROLOGY AT [facility] (MD FOLLOWS UP WITH FOR STROKE) ON 3-17-22, LAST DOSE WAS 3-17-22, Disp: , Rfl:   •  azelastine (ASTELIN) 0.1 % nasal spray, 2 sprays into the nostril(s) as directed by provider 2 (Two) Times a Day. Use in each nostril as directed, Disp: , Rfl:   •  budesonide-formoterol (SYMBICORT) 160-4.5 MCG/ACT inhaler, INHALE 2 PUFFS BY MOUTH TWICE DAILY, Disp: 10.2 g, Rfl: 1  •  Diclofenac Sodium (VOLTAREN) 1 % gel gel, Apply 4 g topically to the appropriate area as directed 4 (Four) Times a Day As Needed (foot pain)., Disp: 350 g, Rfl: 0  •  fluticasone (FLONASE) 50 MCG/ACT nasal spray, 2 sprays into the nostril(s) as directed by provider Daily., Disp: , Rfl:   •  ibuprofen (ADVIL,MOTRIN) 800 MG tablet, Take 800 mg by mouth 3 (Three) Times a Day As Needed for Mild Pain ., Disp: , Rfl:   •  Loratadine-D 24HR  MG per 24 hr tablet, 1 tablet Daily., Disp: , Rfl:   •  montelukast (SINGULAIR) 10 MG tablet, Take 1 tablet by mouth As Needed., Disp: , Rfl:   •  Polyvinyl Alcohol-Povidone PF (HYPOTEARS) 1.4-0.6 % ophthalmic solution, 1-2 drops As Needed for Wound Care., Disp: , Rfl:   •  Refresh 1.4-0.6 % ophthalmic solution, Administer 1 drop to both eyes 2 (Two) Times a Day., Disp: , Rfl:   •  rosuvastatin (CRESTOR) 20 MG tablet, Take 1 tablet by mouth Daily., Disp: , Rfl:   •  sildenafil (VIAGRA) 100 MG tablet, Take 1/2 to full tablet 45 minutes to 1 hour prior to intercourse no more than 1 full tablet in 24 hours., Disp: 30 tablet, Rfl: 4  •  vitamin D3 125 MCG (5000 UT) capsule capsule, Take 5,000 Units by mouth Daily., Disp: , Rfl:     Allergies   Allergen Reactions   • Penicillins Rash        Family History   Problem Relation Age of Onset   •  "Hypertension Mother    • Prostate cancer Father    • Diabetes Sister    • Cancer Brother    • Diabetes Brother    • Bone cancer Brother    • Malig Hyperthermia Neg Hx    • Heart disease Neg Hx    • Stroke Neg Hx        Social History     Socioeconomic History   • Marital status:    Tobacco Use   • Smoking status: Never   • Smokeless tobacco: Never   Vaping Use   • Vaping Use: Never used   Substance and Sexual Activity   • Alcohol use: Never   • Drug use: Never   • Sexual activity: Defer       Vital Signs:   Resp 12   Ht 188 cm (74\")   Wt 110 kg (242 lb 9.6 oz)   BMI 31.15 kg/m²      Physical Exam  Vitals and nursing note reviewed.   Constitutional:       General: He is not in acute distress.     Appearance: Normal appearance.   Neurological:      General: No focal deficit present.      Mental Status: He is alert and oriented to person, place, and time.   Psychiatric:         Mood and Affect: Mood normal.          Result Review :   The following data was reviewed by: STACIE Mckenzie on 01/12/2023:                  Assessment and Plan    Diagnoses and all orders for this visit:    1. Erectile dysfunction, unspecified erectile dysfunction type (Primary)  -     sildenafil (VIAGRA) 100 MG tablet; Take 1/2 to full tablet 45 minutes to 1 hour prior to intercourse no more than 1 full tablet in 24 hours.  Dispense: 30 tablet; Refill: 4    Given previous success with treatment with Viagra will start back on this medication. Advised that he should go to the ER for erection lasting longer than 4 hours or risk permanent damage and inability to have an erection for the remainder of his life. He will follow up in 1 year or sooner for new concerns.       I spent 15 minutes caring for Eric on this date of service. This time includes time spent by me in the following activities:reviewing tests, obtaining and/or reviewing a separately obtained history, performing a medically appropriate examination and/or " evaluation , counseling and educating the patient/family/caregiver, ordering medications, tests, or procedures, and documenting information in the medical record  Follow Up   Return in about 1 year (around 1/12/2024).  Patient was given instructions and counseling regarding his condition or for health maintenance advice. Please see specific information pulled into the AVS if appropriate.         This document has been electronically signed by STACIE Mckenzie  January 12, 2023 14:42 EST

## 2023-05-16 ENCOUNTER — OFFICE VISIT (OUTPATIENT)
Dept: INTERNAL MEDICINE | Facility: CLINIC | Age: 58
End: 2023-05-16
Payer: MEDICARE

## 2023-05-16 VITALS
TEMPERATURE: 98.3 F | DIASTOLIC BLOOD PRESSURE: 64 MMHG | WEIGHT: 241.8 LBS | HEART RATE: 63 BPM | OXYGEN SATURATION: 98 % | SYSTOLIC BLOOD PRESSURE: 130 MMHG | HEIGHT: 74 IN | BODY MASS INDEX: 31.03 KG/M2

## 2023-05-16 DIAGNOSIS — E78.5 HYPERLIPIDEMIA, UNSPECIFIED HYPERLIPIDEMIA TYPE: ICD-10-CM

## 2023-05-16 DIAGNOSIS — Z12.11 SCREENING FOR COLON CANCER: ICD-10-CM

## 2023-05-16 DIAGNOSIS — M79.671 FOOT PAIN, RIGHT: ICD-10-CM

## 2023-05-16 DIAGNOSIS — Z00.00 MEDICARE ANNUAL WELLNESS VISIT, SUBSEQUENT: Primary | ICD-10-CM

## 2023-05-16 DIAGNOSIS — D86.9 SARCOIDOSIS: ICD-10-CM

## 2023-05-16 DIAGNOSIS — Z12.5 SCREENING FOR PROSTATE CANCER: ICD-10-CM

## 2023-05-16 LAB
ALBUMIN SERPL-MCNC: 4.5 G/DL (ref 3.5–5.2)
ALBUMIN/GLOB SERPL: 1.4 G/DL
ALP SERPL-CCNC: 54 U/L (ref 39–117)
ALT SERPL W P-5'-P-CCNC: 26 U/L (ref 1–41)
ANION GAP SERPL CALCULATED.3IONS-SCNC: 6 MMOL/L (ref 5–15)
AST SERPL-CCNC: 24 U/L (ref 1–40)
BASOPHILS # BLD AUTO: 0.05 10*3/MM3 (ref 0–0.2)
BASOPHILS NFR BLD AUTO: 0.9 % (ref 0–1.5)
BILIRUB SERPL-MCNC: 0.7 MG/DL (ref 0–1.2)
BUN SERPL-MCNC: 12 MG/DL (ref 6–20)
BUN/CREAT SERPL: 10.9 (ref 7–25)
CALCIUM SPEC-SCNC: 9.2 MG/DL (ref 8.6–10.5)
CHLORIDE SERPL-SCNC: 106 MMOL/L (ref 98–107)
CHOLEST SERPL-MCNC: 180 MG/DL (ref 0–200)
CO2 SERPL-SCNC: 26 MMOL/L (ref 22–29)
CREAT SERPL-MCNC: 1.1 MG/DL (ref 0.76–1.27)
DEPRECATED RDW RBC AUTO: 41.7 FL (ref 37–54)
EGFRCR SERPLBLD CKD-EPI 2021: 77.3 ML/MIN/1.73
EOSINOPHIL # BLD AUTO: 0.19 10*3/MM3 (ref 0–0.4)
EOSINOPHIL NFR BLD AUTO: 3.2 % (ref 0.3–6.2)
ERYTHROCYTE [DISTWIDTH] IN BLOOD BY AUTOMATED COUNT: 12.5 % (ref 12.3–15.4)
GLOBULIN UR ELPH-MCNC: 3.3 GM/DL
GLUCOSE SERPL-MCNC: 80 MG/DL (ref 65–99)
HCT VFR BLD AUTO: 43.9 % (ref 37.5–51)
HDLC SERPL-MCNC: 41 MG/DL (ref 40–60)
HGB BLD-MCNC: 14.4 G/DL (ref 13–17.7)
IMM GRANULOCYTES # BLD AUTO: 0.04 10*3/MM3 (ref 0–0.05)
IMM GRANULOCYTES NFR BLD AUTO: 0.7 % (ref 0–0.5)
LDLC SERPL CALC-MCNC: 121 MG/DL (ref 0–100)
LDLC/HDLC SERPL: 2.9 {RATIO}
LYMPHOCYTES # BLD AUTO: 1.58 10*3/MM3 (ref 0.7–3.1)
LYMPHOCYTES NFR BLD AUTO: 26.9 % (ref 19.6–45.3)
MCH RBC QN AUTO: 30.1 PG (ref 26.6–33)
MCHC RBC AUTO-ENTMCNC: 32.8 G/DL (ref 31.5–35.7)
MCV RBC AUTO: 91.6 FL (ref 79–97)
MONOCYTES # BLD AUTO: 0.52 10*3/MM3 (ref 0.1–0.9)
MONOCYTES NFR BLD AUTO: 8.9 % (ref 5–12)
NEUTROPHILS NFR BLD AUTO: 3.49 10*3/MM3 (ref 1.7–7)
NEUTROPHILS NFR BLD AUTO: 59.4 % (ref 42.7–76)
NRBC BLD AUTO-RTO: 0 /100 WBC (ref 0–0.2)
PLATELET # BLD AUTO: 274 10*3/MM3 (ref 140–450)
PMV BLD AUTO: 12.2 FL (ref 6–12)
POTASSIUM SERPL-SCNC: 4.2 MMOL/L (ref 3.5–5.2)
PROT SERPL-MCNC: 7.8 G/DL (ref 6–8.5)
PSA SERPL-MCNC: 0.57 NG/ML (ref 0–4)
RBC # BLD AUTO: 4.79 10*6/MM3 (ref 4.14–5.8)
SODIUM SERPL-SCNC: 138 MMOL/L (ref 136–145)
TRIGL SERPL-MCNC: 100 MG/DL (ref 0–150)
TSH SERPL DL<=0.05 MIU/L-ACNC: 1.66 UIU/ML (ref 0.27–4.2)
VLDLC SERPL-MCNC: 18 MG/DL (ref 5–40)
WBC NRBC COR # BLD: 5.87 10*3/MM3 (ref 3.4–10.8)

## 2023-05-16 PROCEDURE — 84443 ASSAY THYROID STIM HORMONE: CPT | Performed by: NURSE PRACTITIONER

## 2023-05-16 PROCEDURE — 85025 COMPLETE CBC W/AUTO DIFF WBC: CPT | Performed by: NURSE PRACTITIONER

## 2023-05-16 PROCEDURE — 80053 COMPREHEN METABOLIC PANEL: CPT | Performed by: NURSE PRACTITIONER

## 2023-05-16 PROCEDURE — G0103 PSA SCREENING: HCPCS | Performed by: NURSE PRACTITIONER

## 2023-05-16 PROCEDURE — 80061 LIPID PANEL: CPT | Performed by: NURSE PRACTITIONER

## 2023-05-16 NOTE — ASSESSMENT & PLAN NOTE
Continue statin, lipid panel with labs.  Provided patient with dietary information on low-cholesterol diet.

## 2023-05-16 NOTE — ASSESSMENT & PLAN NOTE
Doing well currently, Voltaren gel to pharmacy for short-term relief.  Could consider referral to podiatrist in the future if symptoms worsen or persist.

## 2023-05-16 NOTE — ASSESSMENT & PLAN NOTE
Basic labs in clinic today. Encouraged routine dental and eye exams. Discussed age appropriate immunizations, screenings, nutrition and exercise. Age appropriate handout provided.

## 2023-05-16 NOTE — PROGRESS NOTES
The ABCs of the Annual Wellness Visit  Subsequent Medicare Wellness Visit    Subjective    Eric Pal is a 58 y.o. male who presents for a Subsequent Medicare Wellness Visit.    The following portions of the patient's history were reviewed and   updated as appropriate: allergies, current medications, past family history, past medical history, past social history, past surgical history and problem list.    Compared to one year ago, the patient feels his physical   health is better.    Compared to one year ago, the patient feels his mental   health is better.    Recent Hospitalizations:  He was not admitted to the hospital during the last year.       Current Medical Providers:  Patient Care Team:  Tawanna Real APRN as PCP - General (Nurse Practitioner)  Marla Cruz APRN as Nurse Practitioner (Urology)    Outpatient Medications Prior to Visit   Medication Sig Dispense Refill   • albuterol (PROVENTIL) (2.5 MG/3ML) 0.083% nebulizer solution Take 2.5 mg by nebulization Every 4 (Four) Hours As Needed for Wheezing.     • albuterol sulfate  (90 Base) MCG/ACT inhaler Inhale 2 puffs Every 4 (Four) Hours As Needed for Wheezing.     • aspirin 81 MG EC tablet Take 1 tablet by mouth Daily. PT REPORTED DR TELLEZ WAS AWARE PT ALREADY STOPPED ASA WHEN SEEN NEUROLOGY AT [facility] (MD FOLLOWS UP WITH FOR STROKE) ON 3-17-22, LAST DOSE WAS 3-17-22     • budesonide-formoterol (SYMBICORT) 160-4.5 MCG/ACT inhaler INHALE 2 PUFFS BY MOUTH TWICE DAILY 10.2 g 1   • fluticasone (FLONASE) 50 MCG/ACT nasal spray 2 sprays into the nostril(s) as directed by provider Daily.     • ibuprofen (ADVIL,MOTRIN) 800 MG tablet Take 1 tablet by mouth 3 (Three) Times a Day As Needed for Mild Pain.     • Loratadine-D 24HR  MG per 24 hr tablet 1 tablet Daily.     • montelukast (SINGULAIR) 10 MG tablet Take 1 tablet by mouth As Needed.     • Polyvinyl Alcohol-Povidone PF (HYPOTEARS) 1.4-0.6 % ophthalmic solution 1-2 drops As Needed for Wound  Care.     • Refresh 1.4-0.6 % ophthalmic solution Administer 1 drop to both eyes 2 (Two) Times a Day.     • rosuvastatin (CRESTOR) 20 MG tablet Take 1 tablet by mouth Daily.     • sildenafil (VIAGRA) 100 MG tablet Take 1/2 to full tablet 45 minutes to 1 hour prior to intercourse no more than 1 full tablet in 24 hours. 30 tablet 4   • vitamin D3 125 MCG (5000 UT) capsule capsule Take 1 capsule by mouth Daily.     • aspirin 325 MG tablet Take 325 mg by mouth Daily. (Patient not taking: Reported on 5/16/2023)     • azelastine (ASTELIN) 0.1 % nasal spray 2 sprays into the nostril(s) as directed by provider 2 (Two) Times a Day. Use in each nostril as directed (Patient not taking: Reported on 5/16/2023)     • Diclofenac Sodium (VOLTAREN) 1 % gel gel Apply 4 g topically to the appropriate area as directed 4 (Four) Times a Day As Needed (foot pain). (Patient not taking: Reported on 5/16/2023) 350 g 0     No facility-administered medications prior to visit.       No opioid medication identified on active medication list. I have reviewed chart for other potential  high risk medication/s and harmful drug interactions in the elderly.          Aspirin is on active medication list. Aspirin use is indicated based on review of current medical condition/s. Pros and cons of this therapy have been discussed today. Benefits of this medication outweigh potential harm.  Patient has been encouraged to continue taking this medication.  .      Patient Active Problem List   Diagnosis   • History of sinus surgery   • Sarcoidosis   • Chronic rhinosinusitis   • Deviated nasal septum   • Cerebral infarction   • Classical migraine with intractable migraine   • Impaired cognition   • Hyperlipidemia   • Obstructive sleep apnea syndrome   • Other specified postprocedural states   • Seizures   • Shortness of breath   • Stroke   • Medicare annual wellness visit, subsequent   • Foot pain, right     Advance Care Planning   Advance Care Planning    "  Advance Directive is not on file.  ACP discussion was held with the patient during this visit. Patient has an advance directive (not in EMR), copy requested.     Objective    Vitals:    23 1320   BP: 130/64   Pulse: 63   Temp: 98.3 °F (36.8 °C)   TempSrc: Temporal   SpO2: 98%   Weight: 110 kg (241 lb 12.8 oz)   Height: 188 cm (74\")     Estimated body mass index is 31.05 kg/m² as calculated from the following:    Height as of this encounter: 188 cm (74\").    Weight as of this encounter: 110 kg (241 lb 12.8 oz).    BMI is >= 30 and <35. (Class 1 Obesity). The following options were offered after discussion;: exercise counseling/recommendations and nutrition counseling/recommendations      Does the patient have evidence of cognitive impairment? No          HEALTH RISK ASSESSMENT    Smoking Status:  Social History     Tobacco Use   Smoking Status Never   Smokeless Tobacco Never     Alcohol Consumption:  Social History     Substance and Sexual Activity   Alcohol Use Never     Fall Risk Screen:    STEADI Fall Risk Assessment was completed, and patient is at LOW risk for falls.Assessment completed on:2023    Depression Screenin/16/2023     1:27 PM   PHQ-2/PHQ-9 Depression Screening   Little Interest or Pleasure in Doing Things 0-->not at all   Feeling Down, Depressed or Hopeless 0-->not at all   PHQ-9: Brief Depression Severity Measure Score 0       Health Habits and Functional and Cognitive Screenin/16/2023     1:24 PM   Functional & Cognitive Status   Do you have difficulty preparing food and eating? No   Do you have difficulty bathing yourself, getting dressed or grooming yourself? No   Do you have difficulty using the toilet? No   Do you have difficulty moving around from place to place? No   Do you have trouble with steps or getting out of a bed or a chair? Yes   Current Diet Well Balanced Diet   Dental Exam Up to date   Eye Exam Up to date   Exercise (times per week) 7 times per week "   Current Exercises Include Walking;Weightlifting;Treadmill   Do you need help using the phone?  No   Are you deaf or do you have serious difficulty hearing?  No   Do you need help with transportation? No   Do you need help shopping? No   Do you need help preparing meals?  No   Do you need help with housework?  No   Do you need help with laundry? No   Do you need help taking your medications? Yes   Do you need help managing money? No   Do you ever drive or ride in a car without wearing a seat belt? No   Have you felt unusual stress, anger or loneliness in the last month? No   Who do you live with? Spouse   If you need help, do you have trouble finding someone available to you? No   Have you been bothered in the last four weeks by sexual problems? No   Do you have difficulty concentrating, remembering or making decisions? Yes       Age-appropriate Screening Schedule:  Refer to the list below for future screening recommendations based on patient's age, sex and/or medical conditions. Orders for these recommended tests are listed in the plan section. The patient has been provided with a written plan.    Health Maintenance   Topic Date Due   • COLORECTAL CANCER SCREENING  Never done   • HEPATITIS C SCREENING  Never done   • LIPID PANEL  Never done   • ZOSTER VACCINE (1 of 2) 05/16/2023 (Originally 2/24/2014)   • INFLUENZA VACCINE  08/01/2023   • ANNUAL WELLNESS VISIT  05/16/2024   • TDAP/TD VACCINES (2 - Td or Tdap) 01/18/2033   • COVID-19 Vaccine  Completed   • Pneumococcal Vaccine 0-64  Aged Out                  CMS Preventative Services Quick Reference  Risk Factors Identified During Encounter  Immunizations Discussed/Encouraged: Shingrix and COVID19  The above risks/problems have been discussed with the patient.  Pertinent information has been shared with the patient in the After Visit Summary.  An After Visit Summary and PPPS were made available to the patient.    Follow Up:   Next Medicare Wellness visit to be  "scheduled in 1 year.       Additional E&M Note during same encounter follows:  Patient has multiple medical problems which are significant and separately identifiable that require additional work above and beyond the Medicare Wellness Visit.      Chief Complaint  Medicare Wellness-subsequent    Subjective        HPI  Eric Pal is also being seen today for foot pain, chronic conditions    Right foot pain-  Seems to flare with gout. He has been going to the gym and drinking water which seems to reduce flares. States [facility]. Is wondering if there is anything that may help.     HLD-  Patient is curious about dietary changes to reduce choelsterol. He is tolerating cholesterol medication well. [facility] but would like to get them done today.     Sarcoidosis-  Had a flare a few weeks ago but did well by taking steroids that he keeps on hand. [facility]. Continues Symbicort daily.          Objective   Vital Signs:  /64   Pulse 63   Temp 98.3 °F (36.8 °C) (Temporal)   Ht 188 cm (74\")   Wt 110 kg (241 lb 12.8 oz)   SpO2 98%   BMI 31.05 kg/m²     Physical Exam  Constitutional:       Appearance: Normal appearance.   HENT:      Head: Normocephalic and atraumatic.      Nose: Nose normal.      Mouth/Throat:      Mouth: Mucous membranes are moist.      Pharynx: Oropharynx is clear.   Eyes:      Extraocular Movements: Extraocular movements intact.      Conjunctiva/sclera: Conjunctivae normal.      Pupils: Pupils are equal, round, and reactive to light.   Neck:      Thyroid: No thyroid mass, thyromegaly or thyroid tenderness.   Cardiovascular:      Rate and Rhythm: Normal rate and regular rhythm.      Heart sounds: Normal heart sounds.   Pulmonary:      Effort: Pulmonary effort is normal.      Breath sounds: Normal breath sounds.   Skin:     General: Skin is warm and dry.   Neurological:      General: No focal deficit " present.      Mental Status: He is alert and oriented to person, place, and time.   Psychiatric:         Mood and Affect: Mood normal.         Behavior: Behavior normal.         Thought Content: Thought content normal.                         Assessment and Plan   Diagnoses and all orders for this visit:    1. Medicare annual wellness visit, subsequent (Primary)  Assessment & Plan:  Basic labs in clinic today. Encouraged routine dental and eye exams. Discussed age appropriate immunizations, screenings, nutrition and exercise. Age appropriate handout provided.      Orders:  -     Comprehensive Metabolic Panel  -     CBC & Differential  -     TSH  -     Lipid Panel    2. Sarcoidosis  Assessment & Plan:  Continue to follow with pulmonology as scheduled.      3. Hyperlipidemia, unspecified hyperlipidemia type  Assessment & Plan:  Continue statin, lipid panel with labs.  Provided patient with dietary information on low-cholesterol diet.      4. Foot pain, right  Assessment & Plan:  Doing well currently, Voltaren gel to pharmacy for short-term relief.  Could consider referral to podiatrist in the future if symptoms worsen or persist.      5. Screening for colon cancer  Comments:  Colonoscopy ordered.  Orders:  -     Ambulatory Referral For Screening Colonoscopy    6. Screening for prostate cancer  Comments:  PSA today.  Orders:  -     PSA Screen    Other orders  -     Diclofenac Sodium (Voltaren) 1 % gel gel; Apply 4 g topically to the appropriate area as directed 4 (Four) Times a Day As Needed (hand pain).  Dispense: 100 g; Refill: 1           Follow Up   Return if symptoms worsen or fail to improve.  Patient was given instructions and counseling regarding his condition or for health maintenance advice. Please see specific information pulled into the AVS if appropriate.

## 2023-05-17 RX ORDER — ROSUVASTATIN CALCIUM 40 MG/1
40 TABLET, COATED ORAL NIGHTLY
Qty: 90 TABLET | Refills: 1 | Status: SHIPPED | OUTPATIENT
Start: 2023-05-17

## 2025-02-12 ENCOUNTER — APPOINTMENT (OUTPATIENT)
Dept: GENERAL RADIOLOGY | Facility: HOSPITAL | Age: 60
End: 2025-02-12
Payer: MEDICARE

## 2025-02-12 ENCOUNTER — HOSPITAL ENCOUNTER (EMERGENCY)
Facility: HOSPITAL | Age: 60
Discharge: HOME | End: 2025-02-12
Attending: EMERGENCY MEDICINE | Admitting: EMERGENCY MEDICINE
Payer: MEDICARE

## 2025-02-12 VITALS
RESPIRATION RATE: 18 BRPM | OXYGEN SATURATION: 99 % | TEMPERATURE: 98.8 F | WEIGHT: 244.27 LBS | BODY MASS INDEX: 31.35 KG/M2 | DIASTOLIC BLOOD PRESSURE: 91 MMHG | HEART RATE: 51 BPM | SYSTOLIC BLOOD PRESSURE: 146 MMHG | HEIGHT: 74 IN

## 2025-02-12 DIAGNOSIS — S23.41XA SPRAIN OF COSTAL CARTILAGE, INITIAL ENCOUNTER: ICD-10-CM

## 2025-02-12 DIAGNOSIS — M54.9 UPPER BACK PAIN: Primary | ICD-10-CM

## 2025-02-12 LAB
QT INTERVAL: 433 MS
QTC INTERVAL: 388 MS

## 2025-02-12 PROCEDURE — 97161 PT EVAL LOW COMPLEX 20 MIN: CPT | Performed by: PHYSICAL THERAPIST

## 2025-02-12 PROCEDURE — 99283 EMERGENCY DEPT VISIT LOW MDM: CPT

## 2025-02-12 PROCEDURE — 93005 ELECTROCARDIOGRAM TRACING: CPT

## 2025-02-12 PROCEDURE — 71101 X-RAY EXAM UNILAT RIBS/CHEST: CPT

## 2025-02-12 NOTE — THERAPY EVALUATION
Patient Name: Eric Pal  : 1965    MRN: [id number]                              Today's Date: 2025       Admit Date: 2025    Visit Dx:     ICD-10-CM ICD-9-CM   1. Upper back pain  M54.9 724.5     Patient Active Problem List   Diagnosis    History of sinus surgery    Sarcoidosis    Chronic rhinosinusitis    Deviated nasal septum    Cerebral infarction    Classical migraine with intractable migraine    Impaired cognition    Hyperlipidemia    Obstructive sleep apnea syndrome    Other specified postprocedural states    Seizures    Shortness of breath    Stroke    Medicare annual wellness visit, subsequent    Foot pain, right     Past Medical History:   Diagnosis Date    Foot pain, right     Hyperlipidemia     Sarcoidosis     PER PT NO CURRENT PROBLEMS AT THIS TIME    Sinusitis     Stroke 2017    MINI TIA- NO RESIDUAL- FOLLOWS WITH A NEUROLOGIST AT [facility]     Past Surgical History:   Procedure Laterality Date    ENDOSCOPIC FUNCTIONAL SINUS SURGERY (FESS) N/A 2022    Procedure: FUNCTIONAL ENDOSCOPIC SINUS SURGERY WITH MAXILLARY ANTROSTOMY;  Surgeon: Jay Rutledge MD;  Location: [facility] OR [facility];  Service: ENT;  Laterality: N/A;    ENDOSCOPIC FUNCTIONAL SINUS SURGERY (FESS)  2022    Procedure: ;  Surgeon: Jay Rutledge MD;  Location: [facility] OR [facility];  Service: ENT;;    SINUS SURGERY        General Information       Row Name 25 1043          Physical Therapy Time and Intention    Document Type evaluation  -LR     Mode of Treatment individual therapy  -LR       Row Name 25 1043          General Information    Patient Profile Reviewed yes  -LR     Prior Level of Function independent:  -LR               User Key  (r) = Recorded By, (t) = Taken By, (c) = Cosigned By      Initials Name Provider Type    LR Marla Manning, PT Physical Therapist                  History: Patient reports last Friday he started having pain under his right shoulder blade that radiates  around his ribs and into the front of his chest area.  He states the pain will sometimes happen on the left side as well.  Patient states he is only having the pain if he is lying on his side or his back.  He states he does not have any pain if he is moving his arms, lifting, walking, or running.  He states he is not getting any sleep because of the pain and is trying to use a heating pad and extra strength Tylenol to control the pain.  He denies any shortness of air.  He denies any chest pain.  He is right-hand dominant.  He denies numbness and tingling.    Objective:    Palpation: Tender to palpation at inferior to R scapula over rib; tenderness around to posterior and lateral portion of R 8th rib    ROM:  Active Lumbar ROM:  Flexion: WNL  Extension: WNL  L Side bending: WNL  R Side bending: WNL  L Rotation: WNL  R Rotation: WNL    B thoracic rotation: WNL and NO pain  B UE ROM WNL  **pain is NOT reproduced with any ROM through spine or UEs     Strength:  L Shoulder MMT:   R Shoulder MMT:  Flexion: 5   Flexion: 5  Abduction: 5   Abduction: 5  External Rotation: 5  External Rotation: 5  Internal Rotation: 5  Internal Rotation: 5     Sensation: B UE sensation intact to light touch    Assessment/Plan:   Pt presents with a diagnosis of upper back pain and has tenderness over R 8th rib but otherwise has no deficits.  His pain is not reproduced with thoracolumbar ROM or MMT.  Will allow for further workup to rule in/out diagnoses other than musculoskeletal causes.       Outcome Measures       Row Name 02/12/25 1043          Optimal Instrument    Optimal Instrument Optimal - 3  -LR     Lying Flat 3  -LR     Reaching 1  -LR     Lifting 1  -LR     From the list, choose the 3 activities you would most like to be able to do without any difficulty Lying flat;Reaching;Lifting  -LR     Total Score Optimal - 3 5  -LR       Row Name 02/12/25 1043          Functional Assessment    Outcome Measure Options Optimal Instrument  -LR                User Key  (r) = Recorded By, (t) = Taken By, (c) = Cosigned By      Initials Name Provider Type    LR Marla Manning, PT Physical Therapist                     Time Calculation:   PT Evaluation Complexity  History, PT Evaluation Complexity: 3 or more personal factors and/or comorbidities  Examination of Body Systems (PT Eval Complexity): 1-2 elements  Clinical Presentation (PT Evaluation Complexity): stable  Clinical Decision Making (PT Evaluation Complexity): low complexity  Overall Complexity (PT Evaluation Complexity): low complexity     PT Charges       Row Name 02/12/25 1044             Time Calculation    PT Received On 02/12/25  -LR         Untimed Charges    PT Eval/Re-eval Minutes 12  -LR         Total Minutes    Untimed Charges Total Minutes 12  -LR       Total Minutes 12  -LR                User Key  (r) = Recorded By, (t) = Taken By, (c) = Cosigned By      Initials Name Provider Type    LR Marla Manning, PT Physical Therapist                  Therapy Charges for Today       Code Description Service Date Service Provider Modifiers Qty    97851034034 HC PT EVAL LOW COMPLEXITY 1 2/12/2025 Marla Manning, PT GP 1            PT G-Codes  Outcome Measure Options: Optimal Instrument       Marla Manning PT  2/12/2025

## 2025-02-12 NOTE — ED PROVIDER NOTES
Time: 12:00 PM EST  Date of encounter:  2/12/2025  Independent Historian/Clinical History and Information was obtained by:   Patient    History is limited by: N/A    Chief Complaint: Right rib tenderness      History of Present Illness:  Patient is a 59 y.o. year old male who presents to the emergency department for evaluation of right rib tenderness that is worse when he lies down on his right side in bed at night that has been present for the last week.  Patient denies chest pain shortness of breath.      Patient Care Team  Primary Care Provider: Tawanna Real APRN    Past Medical History:     Allergies   Allergen Reactions    Penicillins Rash     Past Medical History:   Diagnosis Date    Foot pain, right     Hyperlipidemia     Sarcoidosis     PER PT NO CURRENT PROBLEMS AT THIS TIME    Sinusitis     Stroke 2017    MINI TIA- NO RESIDUAL- FOLLOWS WITH A NEUROLOGIST AT [facility]     Past Surgical History:   Procedure Laterality Date    ENDOSCOPIC FUNCTIONAL SINUS SURGERY (FESS) N/A 4/5/2022    Procedure: FUNCTIONAL ENDOSCOPIC SINUS SURGERY WITH MAXILLARY ANTROSTOMY;  Surgeon: Jay Rutledge MD;  Location: [facility] OR [facility];  Service: ENT;  Laterality: N/A;    ENDOSCOPIC FUNCTIONAL SINUS SURGERY (FESS)  4/5/2022    Procedure: ;  Surgeon: Jay Rutledge MD;  Location: [facility] OR [facility];  Service: ENT;;    SINUS SURGERY       Family History   Problem Relation Age of Onset    Hypertension Mother     Prostate cancer Father     Diabetes Sister     Cancer Brother     Diabetes Brother     Bone cancer Brother     Malig Hyperthermia Neg Hx     Heart disease Neg Hx     Stroke Neg Hx        Home Medications:  Prior to Admission medications    Medication Sig Start Date End Date Taking? Authorizing Provider   albuterol (PROVENTIL) (2.5 MG/3ML) 0.083% nebulizer solution Take 2.5 mg by nebulization Every 4 (Four) Hours As Needed for Wheezing.    Provider, MD Lupis   albuterol sulfate  (90 Base) MCG/ACT  inhaler Inhale 2 puffs Every 4 (Four) Hours As Needed for Wheezing.    Lupis Lala MD   aspirin 81 MG EC tablet Take 1 tablet by mouth Daily. PT REPORTED DR TELLEZ WAS AWARE PT ALREADY STOPPED ASA WHEN SEEN NEUROLOGY AT [facility] (MD FOLLOWS UP WITH FOR STROKE) ON 3-17-22, LAST DOSE WAS 3-17-22    Lupis Lala MD   budesonide-formoterol (SYMBICORT) 160-4.5 MCG/ACT inhaler INHALE 2 PUFFS BY MOUTH TWICE DAILY 5/2/22   Tawanna Real APRN   Diclofenac Sodium (Voltaren) 1 % gel gel Apply 4 g topically to the appropriate area as directed 4 (Four) Times a Day As Needed (hand pain). 5/16/23   Tawanna Real APRN   fluticasone (FLONASE) 50 MCG/ACT nasal spray 2 sprays into the nostril(s) as directed by provider Daily.    Lupis Lala MD   ibuprofen (ADVIL,MOTRIN) 800 MG tablet Take 1 tablet by mouth 3 (Three) Times a Day As Needed for Mild Pain.    Lupis Lala MD   Loratadine-D 24HR  MG per 24 hr tablet 1 tablet Daily. 4/25/22   Lupis Lala MD   montelukast (SINGULAIR) 10 MG tablet Take 1 tablet by mouth As Needed.    Lupis Lala MD   Polyvinyl Alcohol-Povidone PF (HYPOTEARS) 1.4-0.6 % ophthalmic solution 1-2 drops As Needed for Wound Care.    Lupis Lala MD   Refresh 1.4-0.6 % ophthalmic solution Administer 1 drop to both eyes 2 (Two) Times a Day. 3/2/22   Lupis Lala MD   rosuvastatin (CRESTOR) 40 MG tablet Take 1 tablet by mouth Every Night. 5/17/23   Tawanna Real APRN   sildenafil (VIAGRA) 100 MG tablet Take 1/2 to full tablet 45 minutes to 1 hour prior to intercourse no more than 1 full tablet in 24 hours. 1/12/23   Amanda Dubose APRN   vitamin D3 125 MCG (5000 UT) capsule capsule Take 1 capsule by mouth Daily.    Lupis Lala MD        Social History:   Social History     Tobacco Use    Smoking status: Never    Smokeless tobacco: Never   Vaping Use    Vaping status: Never Used   Substance Use Topics    Alcohol use:  "Never    Drug use: Never         Review of Systems:  Review of Systems   Constitutional:  Negative for chills and fever.   HENT:  Negative for congestion, rhinorrhea and sore throat.    Eyes:  Negative for pain and visual disturbance.   Respiratory:  Negative for apnea, cough, chest tightness and shortness of breath.    Cardiovascular:  Negative for chest pain and palpitations.   Gastrointestinal:  Negative for abdominal pain, diarrhea, nausea and vomiting.   Genitourinary:  Negative for difficulty urinating and dysuria.   Musculoskeletal:  Positive for arthralgias. Negative for joint swelling and myalgias.   Skin:  Negative for color change.   Neurological:  Negative for seizures and headaches.   Psychiatric/Behavioral: Negative.     All other systems reviewed and are negative.       Physical Exam:  /91 (BP Location: Left arm, Patient Position: Sitting)   Pulse 51   Temp 98.8 °F (37.1 °C) (Oral)   Resp 18   Ht 188 cm (74\")   Wt 111 kg (244 lb 4.3 oz)   SpO2 99%   BMI 31.36 kg/m²     Physical Exam  Vitals and nursing note reviewed.   Constitutional:       General: He is not in acute distress.     Appearance: Normal appearance. He is not toxic-appearing.   HENT:      Head: Normocephalic and atraumatic.      Jaw: There is normal jaw occlusion.   Eyes:      General: Lids are normal.      Extraocular Movements: Extraocular movements intact.      Conjunctiva/sclera: Conjunctivae normal.      Pupils: Pupils are equal, round, and reactive to light.   Cardiovascular:      Rate and Rhythm: Normal rate and regular rhythm.      Pulses: Normal pulses.      Heart sounds: Normal heart sounds.   Pulmonary:      Effort: Pulmonary effort is normal. No respiratory distress.      Breath sounds: Normal breath sounds. No wheezing or rhonchi.   Abdominal:      General: Abdomen is flat.      Palpations: Abdomen is soft.      Tenderness: There is no abdominal tenderness. There is no guarding or rebound.   Musculoskeletal:    "      General: Tenderness (Mild appreciated upon palpation to right ribs.) present. Normal range of motion.      Cervical back: Normal range of motion and neck supple.      Right lower leg: No edema.      Left lower leg: No edema.   Skin:     General: Skin is warm and dry.   Neurological:      Mental Status: He is alert and oriented to person, place, and time. Mental status is at baseline.   Psychiatric:         Mood and Affect: Mood normal.                    Medical Decision Making:      Comorbidities that affect care:    Hyperlipidemia, stroke    External Notes reviewed:          The following orders were placed and all results were independently analyzed by me:  Orders Placed This Encounter   Procedures    XR Ribs Right With PA Chest    PT Consult: Eval & Treat Functional Mobility Below Baseline    PT Plan of Care Cert / Re-Cert    ECG 12 Lead Other; rib pain       Medications Given in the Emergency Department:  Medications - No data to display     ED Course:         Labs:    Lab Results (last 24 hours)       ** No results found for the last 24 hours. **             Imaging:    XR Ribs Right With PA Chest    Result Date: 2/12/2025  XR RIBS RIGHT W PA CHEST Date of Exam: 2/12/2025 11:20 AM EST Indication: pain pain under right shoulder blade. Comparison: Chest x-ray 5/16/2019 Findings: . There are no acute displaced right-sided rib fractures. No rib abnormalities on radiograph. Lungs are normally expanded. Heart size is within normal limits. No significant pneumothorax, pleural effusion or focal pulmonary parenchymal opacity. Bones and  soft tissues are within normal limits.    No acute cardiopulmonary abnormality. No acute displaced right-sided rib fractures. Electronically Signed: Rena Siddiqui MD  2/12/2025 11:35 AM EST  Workstation ID: [id number]       Differential Diagnosis and Discussion:    Extremity Pain: Differential diagnosis includes but is not limited to soft tissue sprain, tendonitis, tendon injury,  dislocation, fracture, deep vein thrombosis, arterial insufficiency, osteoarthritis, bursitis, and ligamentous damage.    PROCEDURES:    X-ray were performed in the emergency department and all X-ray impressions were independently interpreted by me.  An EKG was performed and the EKG was interpreted by supervising attending.    ECG 12 Lead Other; rib pain   Preliminary Result   HEART RATE=48  bpm   RR Usijnddo=5292  ms   FL Yfrxgply=398  ms   P Horizontal Axis=-40  deg   P Front Axis=49  deg   QRSD Interval=95  ms   QT Zxryasdn=151  ms   SSbQ=416  ms   QRS Axis=0  deg   T Wave Axis=1  deg   - BORDERLINE ECG -   Sinus bradycardia   Probable left atrial enlargement   When compared with ECG of 28-Mar-2022 14:53:48,   No significant change   Date and Time of Study:2025-02-12 11:12:55          Procedures    MDM     X-ray shows no acute osseous abnormality.  Pain was reproducible upon palpation to right ribs.  I instructed patient to take ibuprofen as needed for discomfort and not to lift anything greater than 10 pounds for the next several weeks.  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  Patient states his heart rate normally runs low.  I instructed patient to return to ED if he develops any new or worsening symptoms.  Patient states he understands and agrees with plan of care.                Patient Care Considerations:          Consultants/Shared Management Plan:    None    Social Determinants of Health:    Patient is independent, reliable, and has access to care.       Disposition and Care Coordination:    Discharged: The patient is suitable and stable for discharge with no need for consideration of admission.    I have explained the patient´s condition, diagnoses and treatment plan based on the information available to me at this time. I have answered questions and addressed any concerns. The patient has a good  understanding of the patient´s diagnosis, condition, and  treatment plan as can be expected at this point. The vital signs have been stable. The patient´s condition is stable and appropriate for discharge from the emergency department.      The patient will pursue further outpatient evaluation with the primary care physician or other designated or consulting physician as outlined in the discharge instructions. They are agreeable to this plan of care and follow-up instructions have been explained in detail. The patient has received these instructions in written format and has expressed an understanding of the discharge instructions. The patient is aware that any significant change in condition or worsening of symptoms should prompt an immediate return to this or the closest emergency department or call to 1.  I have explained discharge medications and the need for follow up with the patient/caretakers. This was also printed in the discharge instructions. Patient was discharged with the following medications and follow up:      Medication List      No changes were made to your prescriptions during this visit.      Tawanna Real, APRN  [address] [id number] [phone]    Call in 1 day  To schedule follow-up       Final diagnoses:   Sprain of costal cartilage, initial encounter        ED Disposition       ED Disposition   Discharge    Condition   Stable    Comment   --               This medical record created using voice recognition software.             Vazquez Lauren PA-C  02/12/25 [id number]

## 2025-02-20 LAB
QT INTERVAL: 433 MS
QTC INTERVAL: 388 MS

## (undated) DEVICE — SOL IRR NACL 0.9PCT BT 1000ML

## (undated) DEVICE — BLADE 1884080EM TRICUT 4MMX13CM M4 ROHS: Brand: FUSION®

## (undated) DEVICE — 1010 S-DRAPE TOWEL DRAPE 10/BX: Brand: STERI-DRAPE™

## (undated) DEVICE — TBG CONN IRR ANSPACH W/HOSECLIPS HIFLO STRL

## (undated) DEVICE — STERILE POLYISOPRENE POWDER-FREE SURGICAL GLOVES: Brand: PROTEXIS

## (undated) DEVICE — MEDI-VAC NON-CONDUCTIVE SUCTION TUBING: Brand: CARDINAL HEALTH

## (undated) DEVICE — DBD-PACK,EENT,SIRUS,PK II: Brand: MEDLINE

## (undated) DEVICE — PATIENT TRACKER  9733534 EM ENT 1PK

## (undated) DEVICE — SLV SCD KN/LEN ADJ EXPRSS BLENDED MD 1P/U

## (undated) DEVICE — SOL IRR NACL 0.9PCT BT LF 500ML STRL

## (undated) DEVICE — SUT PLAIN 1 4/0 1828H

## (undated) DEVICE — SYR LUERLOK 30CC

## (undated) DEVICE — STANDARD HYPODERMIC NEEDLE,POLYPROPYLENE HUB: Brand: MONOJECT

## (undated) DEVICE — PROXIMATE RH ROTATING HEAD SKIN STAPLERS (35 WIDE) CONTAINS 35 STAINLESS STEEL STAPLES: Brand: PROXIMATE

## (undated) DEVICE — CODMAN® SURGICAL PATTIES 1/2" X 3" (1.27CM X 7.62CM): Brand: CODMAN®

## (undated) DEVICE — Device

## (undated) DEVICE — BLADE 1884006EM RAD40 4MM M4 ROTATE ROHS: Brand: FUSION®

## (undated) DEVICE — BASIC SINGLE BASIN-LF: Brand: MEDLINE INDUSTRIES, INC.

## (undated) DEVICE — NDL SPINAL ACCUTARG QUINCKE BK/BVL 22G 5.5IN

## (undated) DEVICE — 3M™ STERI-STRIP™ REINFORCED ADHESIVE SKIN CLOSURES, R1547, 1/2 IN X 4 IN (12 MM X 100 MM), 6 STRIPS/ENVELOPE: Brand: 3M™ STERI-STRIP™

## (undated) DEVICE — PAD GRND REM POLYHESIVE A/ DISP

## (undated) DEVICE — KT ANTI FOG W/FLD AND SPNG

## (undated) DEVICE — SPLINT 1524050 5PK PAIR DOYLE II AIRWAY: Brand: DOYLE II ™

## (undated) DEVICE — MINOR-LF: Brand: MEDLINE INDUSTRIES, INC.

## (undated) DEVICE — ENT-LF: Brand: MEDLINE INDUSTRIES, INC.

## (undated) DEVICE — INSTR TRACKER 9733533 EM ENT

## (undated) DEVICE — TUBING 1895522 5PK STRAIGHTSHOT TO XPS: Brand: STRAIGHTSHOT®

## (undated) DEVICE — NON-ADHERENT PADS PREPACK: Brand: TELFA

## (undated) DEVICE — SUCTION 9735016 MALLEABLE STDTIP 9-FR